# Patient Record
Sex: FEMALE | Race: WHITE | Employment: OTHER | URBAN - NONMETROPOLITAN AREA
[De-identification: names, ages, dates, MRNs, and addresses within clinical notes are randomized per-mention and may not be internally consistent; named-entity substitution may affect disease eponyms.]

---

## 2021-01-13 RX ORDER — FOLIC/CALCIUM/D3/MG CIT/A-CYST 1MG-200-50
1 TABLET ORAL 2 TIMES DAILY
Qty: 60 TABLET | Refills: 11 | Status: SHIPPED | OUTPATIENT
Start: 2021-01-13

## 2021-01-19 RX ORDER — LORATADINE 10 MG/1
TABLET ORAL
Qty: 30 TABLET | Refills: 11 | Status: ON HOLD | OUTPATIENT
Start: 2021-01-19 | End: 2021-03-20 | Stop reason: HOSPADM

## 2021-03-16 ENCOUNTER — HOSPITAL ENCOUNTER (INPATIENT)
Age: 81
LOS: 3 days | Discharge: HOME | DRG: 689 | End: 2021-03-20
Attending: PEDIATRICS | Admitting: FAMILY MEDICINE
Payer: MEDICARE

## 2021-03-16 ENCOUNTER — APPOINTMENT (OUTPATIENT)
Dept: CT IMAGING | Age: 81
DRG: 689 | End: 2021-03-16
Payer: MEDICARE

## 2021-03-16 DIAGNOSIS — N39.0 ACUTE URINARY TRACT INFECTION: ICD-10-CM

## 2021-03-16 DIAGNOSIS — R41.82 ALTERED MENTAL STATUS, UNSPECIFIED ALTERED MENTAL STATUS TYPE: Primary | ICD-10-CM

## 2021-03-16 PROBLEM — F03.90 DEMENTIA (HCC): Status: ACTIVE | Noted: 2021-03-16

## 2021-03-16 PROBLEM — E11.9 TYPE 2 DIABETES MELLITUS (HCC): Status: ACTIVE | Noted: 2021-03-16

## 2021-03-16 LAB
ALBUMIN SERPL-MCNC: 4 G/DL (ref 3.5–5.2)
ALP BLD-CCNC: 69 U/L (ref 35–104)
ALT SERPL-CCNC: 7 U/L (ref 5–33)
ANION GAP SERPL CALCULATED.3IONS-SCNC: 7 MMOL/L (ref 7–19)
AST SERPL-CCNC: 18 U/L (ref 5–32)
BACTERIA: ABNORMAL /HPF
BASOPHILS ABSOLUTE: 0.1 K/UL (ref 0–0.2)
BASOPHILS RELATIVE PERCENT: 1 % (ref 0–1)
BILIRUB SERPL-MCNC: <0.2 MG/DL (ref 0.2–1.2)
BILIRUBIN URINE: NEGATIVE
BLOOD, URINE: NEGATIVE
BUN BLDV-MCNC: 24 MG/DL (ref 8–23)
CALCIUM SERPL-MCNC: 9.2 MG/DL (ref 8.8–10.2)
CHLORIDE BLD-SCNC: 101 MMOL/L (ref 98–111)
CLARITY: CLEAR
CO2: 32 MMOL/L (ref 22–29)
COLOR: YELLOW
CREAT SERPL-MCNC: 0.7 MG/DL (ref 0.5–0.9)
CRYSTALS, UA: ABNORMAL /HPF
EKG P AXIS: 72 DEGREES
EKG P-R INTERVAL: 138 MS
EKG Q-T INTERVAL: 392 MS
EKG QRS DURATION: 88 MS
EKG QTC CALCULATION (BAZETT): 407 MS
EKG T AXIS: 43 DEGREES
EOSINOPHILS ABSOLUTE: 0.2 K/UL (ref 0–0.6)
EOSINOPHILS RELATIVE PERCENT: 2.2 % (ref 0–5)
EPITHELIAL CELLS, UA: 0 /HPF (ref 0–5)
GFR AFRICAN AMERICAN: >59
GFR NON-AFRICAN AMERICAN: >60
GLUCOSE BLD-MCNC: 102 MG/DL (ref 74–109)
GLUCOSE BLD-MCNC: 93 MG/DL (ref 70–99)
GLUCOSE URINE: NEGATIVE MG/DL
HCT VFR BLD CALC: 38.9 % (ref 37–47)
HEMOGLOBIN: 12.5 G/DL (ref 12–16)
HYALINE CASTS: 4 /HPF (ref 0–8)
IMMATURE GRANULOCYTES #: 0 K/UL
KETONES, URINE: NEGATIVE MG/DL
LACTIC ACID, SEPSIS: 0.7 MG/DL (ref 0.5–1.9)
LACTIC ACID, SEPSIS: 0.8 MG/DL (ref 0.5–1.9)
LACTIC ACID: 0.8 MMOL/L (ref 0.5–1.9)
LEUKOCYTE ESTERASE, URINE: ABNORMAL
LYMPHOCYTES ABSOLUTE: 2.6 K/UL (ref 1.1–4.5)
LYMPHOCYTES RELATIVE PERCENT: 32.7 % (ref 20–40)
MCH RBC QN AUTO: 30.3 PG (ref 27–31)
MCHC RBC AUTO-ENTMCNC: 32.1 G/DL (ref 33–37)
MCV RBC AUTO: 94.4 FL (ref 81–99)
MONOCYTES ABSOLUTE: 0.8 K/UL (ref 0–0.9)
MONOCYTES RELATIVE PERCENT: 9.8 % (ref 0–10)
NEUTROPHILS ABSOLUTE: 4.3 K/UL (ref 1.5–7.5)
NEUTROPHILS RELATIVE PERCENT: 53.9 % (ref 50–65)
NITRITE, URINE: POSITIVE
PDW BLD-RTO: 12.3 % (ref 11.5–14.5)
PERFORMED ON: NORMAL
PH UA: 5 (ref 5–8)
PLATELET # BLD: 164 K/UL (ref 130–400)
PMV BLD AUTO: 11.4 FL (ref 9.4–12.3)
POTASSIUM REFLEX MAGNESIUM: 4.2 MMOL/L (ref 3.5–5)
PRO-BNP: 351 PG/ML (ref 0–1800)
PROTEIN UA: NEGATIVE MG/DL
RBC # BLD: 4.12 M/UL (ref 4.2–5.4)
RBC UA: 0 /HPF (ref 0–4)
SARS-COV-2, NAAT: NOT DETECTED
SODIUM BLD-SCNC: 140 MMOL/L (ref 136–145)
SPECIFIC GRAVITY UA: 1.01 (ref 1–1.03)
TOTAL PROTEIN: 7.2 G/DL (ref 6.6–8.7)
TROPONIN: <0.01 NG/ML (ref 0–0.03)
UROBILINOGEN, URINE: 0.2 E.U./DL
WBC # BLD: 8.1 K/UL (ref 4.8–10.8)
WBC UA: 67 /HPF (ref 0–5)

## 2021-03-16 PROCEDURE — 82947 ASSAY GLUCOSE BLOOD QUANT: CPT

## 2021-03-16 PROCEDURE — 80053 COMPREHEN METABOLIC PANEL: CPT

## 2021-03-16 PROCEDURE — 6360000002 HC RX W HCPCS: Performed by: FAMILY MEDICINE

## 2021-03-16 PROCEDURE — 85025 COMPLETE CBC W/AUTO DIFF WBC: CPT

## 2021-03-16 PROCEDURE — 81001 URINALYSIS AUTO W/SCOPE: CPT

## 2021-03-16 PROCEDURE — 83605 ASSAY OF LACTIC ACID: CPT

## 2021-03-16 PROCEDURE — 70450 CT HEAD/BRAIN W/O DYE: CPT

## 2021-03-16 PROCEDURE — 87086 URINE CULTURE/COLONY COUNT: CPT

## 2021-03-16 PROCEDURE — 84484 ASSAY OF TROPONIN QUANT: CPT

## 2021-03-16 PROCEDURE — 6360000002 HC RX W HCPCS: Performed by: PEDIATRICS

## 2021-03-16 PROCEDURE — 87040 BLOOD CULTURE FOR BACTERIA: CPT

## 2021-03-16 PROCEDURE — 96374 THER/PROPH/DIAG INJ IV PUSH: CPT

## 2021-03-16 PROCEDURE — 2580000003 HC RX 258: Performed by: PEDIATRICS

## 2021-03-16 PROCEDURE — 93005 ELECTROCARDIOGRAM TRACING: CPT | Performed by: PEDIATRICS

## 2021-03-16 PROCEDURE — 36415 COLL VENOUS BLD VENIPUNCTURE: CPT

## 2021-03-16 PROCEDURE — G0378 HOSPITAL OBSERVATION PER HR: HCPCS

## 2021-03-16 PROCEDURE — 96372 THER/PROPH/DIAG INJ SC/IM: CPT

## 2021-03-16 PROCEDURE — 6370000000 HC RX 637 (ALT 250 FOR IP): Performed by: FAMILY MEDICINE

## 2021-03-16 PROCEDURE — 99285 EMERGENCY DEPT VISIT HI MDM: CPT

## 2021-03-16 PROCEDURE — 93010 ELECTROCARDIOGRAM REPORT: CPT | Performed by: INTERNAL MEDICINE

## 2021-03-16 PROCEDURE — 87186 SC STD MICRODIL/AGAR DIL: CPT

## 2021-03-16 PROCEDURE — 87635 SARS-COV-2 COVID-19 AMP PRB: CPT

## 2021-03-16 PROCEDURE — 83880 ASSAY OF NATRIURETIC PEPTIDE: CPT

## 2021-03-16 PROCEDURE — P9612 CATHETERIZE FOR URINE SPEC: HCPCS

## 2021-03-16 RX ORDER — MEMANTINE HYDROCHLORIDE 5 MG/1
10 TABLET ORAL 2 TIMES DAILY
Status: DISCONTINUED | OUTPATIENT
Start: 2021-03-16 | End: 2021-03-20 | Stop reason: HOSPADM

## 2021-03-16 RX ORDER — FUROSEMIDE 20 MG/1
20 TABLET ORAL DAILY
Status: DISCONTINUED | OUTPATIENT
Start: 2021-03-16 | End: 2021-03-20 | Stop reason: HOSPADM

## 2021-03-16 RX ORDER — SODIUM CHLORIDE 0.9 % (FLUSH) 0.9 %
10 SYRINGE (ML) INJECTION PRN
Status: DISCONTINUED | OUTPATIENT
Start: 2021-03-16 | End: 2021-03-16 | Stop reason: SDUPTHER

## 2021-03-16 RX ORDER — METOPROLOL SUCCINATE 50 MG/1
50 TABLET, EXTENDED RELEASE ORAL DAILY
COMMUNITY

## 2021-03-16 RX ORDER — ASPIRIN 81 MG/1
81 TABLET ORAL DAILY
Status: DISCONTINUED | OUTPATIENT
Start: 2021-03-16 | End: 2021-03-20 | Stop reason: HOSPADM

## 2021-03-16 RX ORDER — ACETAMINOPHEN 325 MG/1
650 TABLET ORAL EVERY 6 HOURS PRN
Status: DISCONTINUED | OUTPATIENT
Start: 2021-03-16 | End: 2021-03-20 | Stop reason: HOSPADM

## 2021-03-16 RX ORDER — MEMANTINE HYDROCHLORIDE 10 MG/1
10 TABLET ORAL 2 TIMES DAILY
COMMUNITY

## 2021-03-16 RX ORDER — FOLIC/CALCIUM/D3/MG CIT/A-CYST 1MG-200-50
1 TABLET ORAL 2 TIMES DAILY
Status: DISCONTINUED | OUTPATIENT
Start: 2021-03-16 | End: 2021-03-20 | Stop reason: HOSPADM

## 2021-03-16 RX ORDER — SODIUM CHLORIDE 0.9 % (FLUSH) 0.9 %
10 SYRINGE (ML) INJECTION EVERY 12 HOURS SCHEDULED
Status: DISCONTINUED | OUTPATIENT
Start: 2021-03-16 | End: 2021-03-16 | Stop reason: SDUPTHER

## 2021-03-16 RX ORDER — POTASSIUM CHLORIDE 750 MG/1
10 TABLET, FILM COATED, EXTENDED RELEASE ORAL DAILY
COMMUNITY

## 2021-03-16 RX ORDER — POTASSIUM CHLORIDE 750 MG/1
10 TABLET, EXTENDED RELEASE ORAL DAILY
Status: DISCONTINUED | OUTPATIENT
Start: 2021-03-16 | End: 2021-03-20 | Stop reason: HOSPADM

## 2021-03-16 RX ORDER — POLYETHYLENE GLYCOL 3350 17 G/17G
17 POWDER, FOR SOLUTION ORAL DAILY PRN
Status: DISCONTINUED | OUTPATIENT
Start: 2021-03-16 | End: 2021-03-20 | Stop reason: HOSPADM

## 2021-03-16 RX ORDER — FUROSEMIDE 20 MG/1
20 TABLET ORAL DAILY
COMMUNITY

## 2021-03-16 RX ORDER — ASPIRIN 300 MG/1
300 SUPPOSITORY RECTAL DAILY
Status: DISCONTINUED | OUTPATIENT
Start: 2021-03-16 | End: 2021-03-20 | Stop reason: HOSPADM

## 2021-03-16 RX ORDER — 0.9 % SODIUM CHLORIDE 0.9 %
1000 INTRAVENOUS SOLUTION INTRAVENOUS ONCE
Status: COMPLETED | OUTPATIENT
Start: 2021-03-16 | End: 2021-03-16

## 2021-03-16 RX ORDER — METOPROLOL SUCCINATE 50 MG/1
50 TABLET, EXTENDED RELEASE ORAL DAILY
Status: DISCONTINUED | OUTPATIENT
Start: 2021-03-16 | End: 2021-03-20 | Stop reason: HOSPADM

## 2021-03-16 RX ORDER — GUAIFENESIN 600 MG/1
1200 TABLET, EXTENDED RELEASE ORAL 2 TIMES DAILY
COMMUNITY

## 2021-03-16 RX ORDER — CETIRIZINE HYDROCHLORIDE 5 MG/1
5 TABLET ORAL DAILY
Status: DISCONTINUED | OUTPATIENT
Start: 2021-03-16 | End: 2021-03-20 | Stop reason: HOSPADM

## 2021-03-16 RX ORDER — ACETAMINOPHEN 325 MG/1
650 TABLET ORAL EVERY 6 HOURS PRN
COMMUNITY

## 2021-03-16 RX ORDER — ATORVASTATIN CALCIUM 80 MG/1
80 TABLET, FILM COATED ORAL NIGHTLY
Status: DISCONTINUED | OUTPATIENT
Start: 2021-03-16 | End: 2021-03-20 | Stop reason: HOSPADM

## 2021-03-16 RX ORDER — SIMVASTATIN 40 MG
40 TABLET ORAL NIGHTLY
COMMUNITY
End: 2021-12-20

## 2021-03-16 RX ORDER — PROMETHAZINE HYDROCHLORIDE 12.5 MG/1
12.5 TABLET ORAL EVERY 6 HOURS PRN
Status: DISCONTINUED | OUTPATIENT
Start: 2021-03-16 | End: 2021-03-20 | Stop reason: HOSPADM

## 2021-03-16 RX ORDER — OMEPRAZOLE 20 MG/1
20 CAPSULE, DELAYED RELEASE ORAL DAILY
COMMUNITY

## 2021-03-16 RX ORDER — SODIUM CHLORIDE 0.9 % (FLUSH) 0.9 %
10 SYRINGE (ML) INJECTION EVERY 12 HOURS SCHEDULED
Status: DISCONTINUED | OUTPATIENT
Start: 2021-03-16 | End: 2021-03-20 | Stop reason: HOSPADM

## 2021-03-16 RX ORDER — RISPERIDONE 0.5 MG/1
0.5 TABLET, FILM COATED ORAL 2 TIMES DAILY
COMMUNITY

## 2021-03-16 RX ORDER — PANTOPRAZOLE SODIUM 40 MG/1
40 TABLET, DELAYED RELEASE ORAL
Status: DISCONTINUED | OUTPATIENT
Start: 2021-03-17 | End: 2021-03-20 | Stop reason: HOSPADM

## 2021-03-16 RX ORDER — LORATADINE 10 MG/1
10 TABLET ORAL DAILY
COMMUNITY
End: 2021-11-22

## 2021-03-16 RX ORDER — SODIUM CHLORIDE 0.9 % (FLUSH) 0.9 %
10 SYRINGE (ML) INJECTION PRN
Status: DISCONTINUED | OUTPATIENT
Start: 2021-03-16 | End: 2021-03-20 | Stop reason: HOSPADM

## 2021-03-16 RX ORDER — ONDANSETRON 2 MG/ML
4 INJECTION INTRAMUSCULAR; INTRAVENOUS EVERY 6 HOURS PRN
Status: DISCONTINUED | OUTPATIENT
Start: 2021-03-16 | End: 2021-03-20 | Stop reason: HOSPADM

## 2021-03-16 RX ORDER — PAROXETINE HYDROCHLORIDE 20 MG/1
20 TABLET, FILM COATED ORAL EVERY MORNING
COMMUNITY

## 2021-03-16 RX ORDER — RALOXIFENE HYDROCHLORIDE 60 MG/1
60 TABLET, FILM COATED ORAL DAILY
COMMUNITY

## 2021-03-16 RX ORDER — LABETALOL HYDROCHLORIDE 5 MG/ML
10 INJECTION, SOLUTION INTRAVENOUS EVERY 10 MIN PRN
Status: DISCONTINUED | OUTPATIENT
Start: 2021-03-16 | End: 2021-03-20 | Stop reason: HOSPADM

## 2021-03-16 RX ORDER — ATORVASTATIN CALCIUM 20 MG/1
20 TABLET, FILM COATED ORAL NIGHTLY
Status: DISCONTINUED | OUTPATIENT
Start: 2021-03-16 | End: 2021-03-16 | Stop reason: DRUGHIGH

## 2021-03-16 RX ORDER — ACETAMINOPHEN 650 MG/1
650 SUPPOSITORY RECTAL EVERY 6 HOURS PRN
Status: DISCONTINUED | OUTPATIENT
Start: 2021-03-16 | End: 2021-03-20 | Stop reason: HOSPADM

## 2021-03-16 RX ORDER — GUAIFENESIN 600 MG/1
1200 TABLET, EXTENDED RELEASE ORAL 2 TIMES DAILY
Status: DISCONTINUED | OUTPATIENT
Start: 2021-03-16 | End: 2021-03-20 | Stop reason: HOSPADM

## 2021-03-16 RX ADMIN — CEFTRIAXONE 1000 MG: 1 INJECTION, POWDER, FOR SOLUTION INTRAMUSCULAR; INTRAVENOUS at 18:47

## 2021-03-16 RX ADMIN — ENOXAPARIN SODIUM 40 MG: 40 INJECTION SUBCUTANEOUS at 21:05

## 2021-03-16 RX ADMIN — SODIUM CHLORIDE 1000 ML: 9 INJECTION, SOLUTION INTRAVENOUS at 18:47

## 2021-03-16 RX ADMIN — ASPIRIN 300 MG: 300 SUPPOSITORY RECTAL at 20:59

## 2021-03-16 NOTE — ED NOTES
Bed: 15  Expected date:   Expected time:   Means of arrival:   Comments:  EMS     Cayden Butler RN  03/16/21 1581

## 2021-03-16 NOTE — ED NOTES
Neurological assessment performed but patient is not oriented at this time     Isaías Christine, BEATRIZ  03/16/21 3223

## 2021-03-16 NOTE — H&P
Hospital Medicine  History and Physical    Patient:  Anne Gardiner  MRN: [id number]    CHIEF COMPLAINT:  Altered mental status    History Obtained From: daughter, OSH records, chart    PCP: Nisa Corea MD    HISTORY OF PRESENT ILLNESS:  78year old white female brought from the SNF to emergency department due to lethargy, confusion, and facial droop noted early this afternoon. She has a recurring problem with urinary tract infections per her daughter, who is a nurse at this facility. No recent antibiotic use. Had COVID-19 a few months ago but had recovered. No treatment prior to arrival. No exacerbating or relieving factors. Patient is too lethargic to participate in history or physical exam.    REVIEW OF SYSTEMS:  14 systems reviewed and negative except as noted above. Past Medical History:      Diagnosis Date    Dementia without behavioral disturbance (HCC)     Dysphagia     GERD (gastroesophageal reflux disease)     History of COVID-19     History of fall     History of urinary tract infection     Hyperlipidemia     Osteoporosis     Other schizoaffective disorders (HCC)     Personal care impairment     [facility]     Type 2 diabetes mellitus ([facility] [address] [facility]     Vascular dementia ([facility] [address]        Past Surgical History:  History reviewed. No pertinent surgical history. Medications Prior to Admission:    Prior to Admission medications    Medication Sig Start Date End Date Taking?  Authorizing Provider   acetaminophen (TYLENOL) 325 MG tablet Take 650 mg by mouth every 6 hours as needed for Pain   Yes Historical Provider, MD   Folic Acid-Cholecalciferol 5-2805 MG-UNIT TABS Take by mouth   Yes Historical Provider, MD   furosemide (LASIX) 20 MG tablet Take 20 mg by mouth daily   Yes Historical Provider, MD   loratadine (CLARITIN) 10 MG tablet Take 10 mg by mouth daily   Yes Historical Provider, MD   metoprolol succinate (TOPROL XL) 50 MG extended release tablet Take 50 mg by mouth daily   Yes Historical Provider, MD   magnesium hydroxide (MILK OF MAGNESIA) 400 MG/5ML suspension Take 5 mLs by mouth daily as needed for Constipation   Yes Historical Provider, MD   guaiFENesin (MUCINEX) 600 MG extended release tablet Take 1,200 mg by mouth 2 times daily   Yes Historical Provider, MD   memantine (NAMENDA) 10 MG tablet Take 10 mg by mouth 2 times daily   Yes Historical Provider, MD   PARoxetine (PAXIL) 20 MG tablet Take 20 mg by mouth every morning   Yes Historical Provider, MD   potassium chloride (KLOR-CON) 10 MEQ extended release tablet Take 10 mEq by mouth daily   Yes Historical Provider, MD   omeprazole (PRILOSEC) 20 MG delayed release capsule Take 20 mg by mouth daily   Yes Historical Provider, MD   raloxifene (EVISTA) 60 MG tablet Take 60 mg by mouth daily   Yes Historical Provider, MD   risperiDONE (RISPERDAL) 0.5 MG tablet Take 0.5 mg by mouth 2 times daily   Yes Historical Provider, MD   simvastatin (ZOCOR) 40 MG tablet Take 40 mg by mouth nightly   Yes Historical Provider, MD   loratadine (CLARITIN) 10 MG tablet TAKE ONE TABLET BY MOUTH AT 0900 1/19/21  Yes Levar Vazquez MD   FA-D3-Mg Cit-Acetylcyst-Ca Cit (FOLITE) TABS Take 1 tablet by mouth 2 times daily 1/13/21  Yes Levar Vazquez MD       Allergies:  Fosamax [alendronate]    Social History:   TOBACCO:   has no history on file for tobacco.  ETOH:   has no history on file for alcohol. Family History:   History reviewed. No pertinent family history.         Physical Exam:    Vitals: /85   Pulse 78   Temp 98.7 °F (37.1 °C) (Rectal)   Resp 26   SpO2 96%   24HR INTAKE/OUTPUT:  No intake or output data in the 24 hours ending 03/16/21 1828  General appearance: acutely and chronically ill, lethargic  HEENT: atraumatic, eyes with clear conjunctiva and normal lids, pupils and irises normal, external ears and nose are normal, lips normal. Neck without masses, lympadenopathy, bruit, thyroid normal  Lungs: no increased work of breathing, diminished breath sounds bilaterally  Heart: regular rate and rhythm, S1, S2 normal, no murmur, click, rub or gallop  Abdomen: soft, nondistended, not visibly tender  Extremities: edema trace bilaterally, modified Vikash's negative  Neurologic: lethargic, flattened affect, unable to participate in history, disoriented  Skin: no rashes, nodules. CBC:   Recent Labs     03/16/21  1507   WBC 8.1   HGB 12.5        BMP:    Recent Labs     03/16/21  1507      K 4.2      CO2 32*   BUN 24*   CREATININE 0.7   GLUCOSE 102     Hepatic:   Recent Labs     03/16/21  1507   AST 18   ALT 7   BILITOT <0.2   ALKPHOS 69     Troponin T:   Recent Labs     03/16/21  1507   TROPONINI <0.01     ABGs: No results for input(s): PH, PCO2, PO2, HCO3, BE, O2SAT in the last 72 hours. INR: No results for input(s): INR in the last 72 hours. Lactic Acid:   Recent Labs     03/16/21  1507   LACTA 0.8       URINALYSIS: bacteria, WBCs  -----------------------------------------------------------------  CT Head: no acute process    EKG: NSR, PVC, no acute ischemia or infarction    Assessment and Plan   Principal Problem:    Complicated urinary tract infection  Active Problems:    Dementia (HCC)    Altered mental status    Type 2 diabetes mellitus ([facility]  Resolved Problems:    * No resolved hospital problems.  *    Henrietta Curling, DO  Admitting Hospitalist

## 2021-03-17 PROBLEM — E78.5 HLD (HYPERLIPIDEMIA): Status: ACTIVE | Noted: 2021-03-17

## 2021-03-17 PROBLEM — G93.41 ACUTE METABOLIC ENCEPHALOPATHY: Status: ACTIVE | Noted: 2021-03-17

## 2021-03-17 PROBLEM — I10 HTN (HYPERTENSION): Status: ACTIVE | Noted: 2021-03-17

## 2021-03-17 LAB
ANION GAP SERPL CALCULATED.3IONS-SCNC: 8 MMOL/L (ref 7–19)
BUN BLDV-MCNC: 24 MG/DL (ref 8–23)
CALCIUM SERPL-MCNC: 8.2 MG/DL (ref 8.8–10.2)
CHLORIDE BLD-SCNC: 108 MMOL/L (ref 98–111)
CHOLESTEROL, TOTAL: 132 MG/DL (ref 160–199)
CO2: 27 MMOL/L (ref 22–29)
CREAT SERPL-MCNC: 0.7 MG/DL (ref 0.5–0.9)
GFR AFRICAN AMERICAN: >59
GFR NON-AFRICAN AMERICAN: >60
GLUCOSE BLD-MCNC: 93 MG/DL (ref 74–109)
HCT VFR BLD CALC: 33.5 % (ref 37–47)
HDLC SERPL-MCNC: 36 MG/DL (ref 65–121)
HEMOGLOBIN: 10.5 G/DL (ref 12–16)
LDL CHOLESTEROL CALCULATED: 72 MG/DL
MCH RBC QN AUTO: 30 PG (ref 27–31)
MCHC RBC AUTO-ENTMCNC: 31.3 G/DL (ref 33–37)
MCV RBC AUTO: 95.7 FL (ref 81–99)
PDW BLD-RTO: 12.3 % (ref 11.5–14.5)
PLATELET # BLD: 151 K/UL (ref 130–400)
PMV BLD AUTO: 11.9 FL (ref 9.4–12.3)
POTASSIUM REFLEX MAGNESIUM: 3.9 MMOL/L (ref 3.5–5)
RBC # BLD: 3.5 M/UL (ref 4.2–5.4)
SODIUM BLD-SCNC: 143 MMOL/L (ref 136–145)
TRIGL SERPL-MCNC: 120 MG/DL (ref 0–149)
WBC # BLD: 7.5 K/UL (ref 4.8–10.8)

## 2021-03-17 PROCEDURE — 99233 SBSQ HOSP IP/OBS HIGH 50: CPT | Performed by: PSYCHIATRY & NEUROLOGY

## 2021-03-17 PROCEDURE — 2580000003 HC RX 258: Performed by: FAMILY MEDICINE

## 2021-03-17 PROCEDURE — 6360000002 HC RX W HCPCS: Performed by: FAMILY MEDICINE

## 2021-03-17 PROCEDURE — 85027 COMPLETE CBC AUTOMATED: CPT

## 2021-03-17 PROCEDURE — 6370000000 HC RX 637 (ALT 250 FOR IP): Performed by: FAMILY MEDICINE

## 2021-03-17 PROCEDURE — 92610 EVALUATE SWALLOWING FUNCTION: CPT

## 2021-03-17 PROCEDURE — 80048 BASIC METABOLIC PNL TOTAL CA: CPT

## 2021-03-17 PROCEDURE — 36415 COLL VENOUS BLD VENIPUNCTURE: CPT

## 2021-03-17 PROCEDURE — 1210000000 HC MED SURG R&B

## 2021-03-17 PROCEDURE — 80061 LIPID PANEL: CPT

## 2021-03-17 RX ADMIN — ATORVASTATIN CALCIUM 80 MG: 80 TABLET, FILM COATED ORAL at 21:28

## 2021-03-17 RX ADMIN — ASPIRIN 300 MG: 300 SUPPOSITORY RECTAL at 10:34

## 2021-03-17 RX ADMIN — MEMANTINE HYDROCHLORIDE 10 MG: 5 TABLET ORAL at 21:28

## 2021-03-17 RX ADMIN — SODIUM CHLORIDE, PRESERVATIVE FREE 10 ML: 5 INJECTION INTRAVENOUS at 09:52

## 2021-03-17 RX ADMIN — SODIUM CHLORIDE, PRESERVATIVE FREE 1000 MG: 5 INJECTION INTRAVENOUS at 17:37

## 2021-03-17 RX ADMIN — ENOXAPARIN SODIUM 40 MG: 40 INJECTION SUBCUTANEOUS at 21:28

## 2021-03-17 RX ADMIN — GUAIFENESIN 1200 MG: 600 TABLET, EXTENDED RELEASE ORAL at 21:28

## 2021-03-17 ASSESSMENT — ENCOUNTER SYMPTOMS
VOMITING: 0
BACK PAIN: 1
NAUSEA: 0
PHOTOPHOBIA: 0
COUGH: 0

## 2021-03-17 NOTE — PROGRESS NOTES
Speech Language Pathology  Facility/Department: [facility] SURG SERVICES   CLINICAL BEDSIDE SWALLOW EVALUATION    NAME: Terence Trammell  : 1940  MRN: [id number]  ADMISSION DATE: 3/16/2021  ADMITTING DIAGNOSIS: has Complicated urinary tract infection; Dementia ([address] Altered mental status; and Type 2 diabetes mellitus ([facility] [address] on their problem list.     ONSET DATE: 21    Date of Eval: 3/17/2021     Evaluating Therapist: Davie Sharma    Current Diet level:  Current Diet : NPO  Current Liquid Diet : NPO    Primary Complaint  Patient Complaint: Dry mouth     PER PHYSICIAN'S notes:  HISTORY OF PRESENT ILLNESS:  78year old white female brought from the SNF to emergency department due to lethargy, confusion, and facial droop noted early this afternoon. She has a recurring problem with urinary tract infections per her daughter, who is a nurse at this facility. No recent antibiotic use. Had COVID-19 a few months ago but had recovered. No treatment prior to arrival. No exacerbating or relieving factors. Patient is too lethargic to participate in history or physical exam.       Reason for Referral  Terence Trammell was referred for a bedside swallow evaluation to assess the efficiency of her swallow function, identify signs and symptoms of aspiration and make recommendations regarding safe dietary consistencies, effective compensatory strategies, and safe eating environment. Subjective  Pt lethargic and asleep in bed. Pt awoke for CBSE, however had difficulty maintaining alertness. Pt did verbalize a few times and opened eyes on command. SLP continued with evaluation to place pt's diet/liquid orders. Objective:  SLP did not present diet textures due to pt's lethargy and maintaining alertness. However, SLP notes; with pt's decreased alertness level, she continued to follow simple directions with PO presentations and swallowed with all presentations.  No overt s/s of aspiration were observed at the bedside. Impression  Pt presented with mild oral phase dysphagia at the bedside. Pt demonstrated poor labial and lingual coordination, strength which could fatigue mechanisms over a full meal. Pt's pharyngeal phase appeared grossly intact; however further assessment will be completed when pt becomes more alert. SLP recommends minced and moist diet texture with thin liquids. All medications crushed in puree. Dysphagia Diagnosis: Mild oral stage dysphagia    Dysphagia Outcome Severity Scale: Level 5: Mild dysphagia- Distant supervision. May need one diet consistency restricted     Treatment Plan  Requires SLP Intervention: Yes  Duration/Frequency of Treatment: 2-3x for 2-3 days. D/C Recommendations: To be determined     Recommended Diet and Intervention  Diet Solids Recommendation: Dysphagia Minced and Moist (Dysphagia II)  Liquid Consistency Recommendation: Thin  Recommended Form of Meds: Crushed in puree as able  Therapeutic Interventions: Diet tolerance monitoring;Oral care; Patient/Family education    Compensatory Swallowing Strategies  Compensatory Swallowing Strategies: No straws;Upright as possible for all oral intake;Assist feed    Treatment/Goals  Short-term Goals  Timeframe for Short-term Goals: 2-3 days  Goal 1: Tolerate minced and moist diet without s/s of aspiration. Goal 2: Tolerate thin liquids without overt s/s of aspiraiton. Goal 3: Re-assess for potential diet upgrade  Goal 4: Possible cognitive/speech/language evaluation  Goal 5: Staff/family/pt to complete oral care routine to decrease oral bacteria  Goal 6: Pt/family/staff to follow safe swallow and aspiration precautions 100% of opportunities. Long-term Goals  Timeframe for Long-term Goals: 2-3 days  Goal 1: Tolerate safer and least restrictive diet and liquid with minimal to no overt s/s of aspiration. General  Chart Reviewed: Yes  Comments: Pt's daughter at bedside.  Reported she does not have hx of swallowing difficulty. Behavior/Cognition: Cooperative;Lethargic;Requires cueing  Temperature Spikes Noted: No  Respiratory Status: Room air  Breath Sounds: Clear  O2 Device: None (Room air)  Follows Directions: Simple  Dentition: Edentulous  Patient Positioning: Upright in bed  Baseline Vocal Quality: Weak  Prior Dysphagia History: No hx of MBSS in chart. Pt's medical hx revealed dysphagia and GERD dx. Consistencies Administered: Ice Chips; Thin - cup; Thin - straw      Oral Motor Deficits  Oral Motor: Exceptions to [facility]  Labial Strength: Reduced  Labial Sensation: Reduced  Labial Coordination: Reduced  Lingual Strength: Reduced  Lingual Sensation: Reduced  Lingual Coordination: Reduced    Oral Phase Dysfunction  Oral Phase - Comment: Pt's oral phase is affected by her edentulous status. However, pt was too lethargic for SLP to present solid trials. With liquids, pt could not coordinate labial seal around straw for functional sucking. Pt also demonstrated fast oral transit of liquids, however no overt s/s of aspiraiton. Indicators of Pharyngeal Phase Dysfunction  Pharyngeal Phase: WFL  Pharyngeal: Pt's pharyngeal phase appeared to be functional at the bedside. Pt's hyolaryngeal elevation was slightly decreased, which is attributed to her lethargy at time of evaluation. Pt swallowed trials of thin liquids via open cup and teaspoon without overt s/s of aspiraiton. Pt produced 1x additional swallow with all trials. Pt swallowed without PO trials independently. Education  Patient Education: Daughter was provided verbal education on evaluation and diet consistency. She discussed no puree diet texture, but agreed to modified diet at this time with re-eval of diet texture at later date.           RECS  Minced and moist diet  Thin liquids open cup or spoonfuls  Oral care  Meds crushed in puree      ANABEL Bolden  3/17/2021 11:56 AM      Electronically signed by ANABEL Patton on 3/17/2021 at 12:01 PM

## 2021-03-17 NOTE — PROGRESS NOTES
tract infection 3/16/2021 Yes    Dementia ([facility] [address] 3/16/2021 Yes    Altered mental status 3/16/2021 Yes    Type 2 diabetes mellitus ([facility] [address] 3/16/2021 Yes    HTN (hypertension) 3/17/2021 Yes    HLD (hyperlipidemia) 4/29/4633 Yes        Complicated urinary tract infection due to Klebsiella pneumonia  -Awaiting sensitivities of urine culture, blood cultures in process  -Day #2 ceftriaxone  -Tylenol for temperature or pain  -Evidence of sepsis, lactic acid unremarkable localized infection      Altered mental status/history of dementia  -Electrolytes within normal limits, continue to monitor, likely related to diagnosis listed above  -SLP, PT/OT  -Head CT with no acute intracranial abnormality    Type 2 diabetes -condition, continue with insulin modalities, Accu-Cheks q. ACH S, hypoglycemic protocol    Essential hypertension-chronic condition, continue with antihypertensive regimen, routine vitals    Hyperlipidemia-lipid panel reviewed, will continue with patient's statin therapy    EMR Dragon/Transcription disclaimer:   Much of this encounter note is an electronic transcription/translation of spoken language to printed text.  The electronic translation of spoken language may permit erroneous, or at times, nonsensical words or phrases to be inadvertently transcribed; although attempts have made to review the note for such errors, some may still exist.    Electronically signed by   Inna Connell   Internal Medicine Hospitalist  On 3/17/2021  At 12:45 PM

## 2021-03-17 NOTE — CONSULTS
Acid-Cholecalciferol 8-7809 MG-UNIT TABS Take by mouth   Yes Historical Provider, MD   furosemide (LASIX) 20 MG tablet Take 20 mg by mouth daily   Yes Historical Provider, MD   loratadine (CLARITIN) 10 MG tablet Take 10 mg by mouth daily   Yes Historical Provider, MD   metoprolol succinate (TOPROL XL) 50 MG extended release tablet Take 50 mg by mouth daily   Yes Historical Provider, MD   magnesium hydroxide (MILK OF MAGNESIA) 400 MG/5ML suspension Take 5 mLs by mouth daily as needed for Constipation   Yes Historical Provider, MD   guaiFENesin (MUCINEX) 600 MG extended release tablet Take 1,200 mg by mouth 2 times daily   Yes Historical Provider, MD   memantine (NAMENDA) 10 MG tablet Take 10 mg by mouth 2 times daily   Yes Historical Provider, MD   PARoxetine (PAXIL) 20 MG tablet Take 20 mg by mouth every morning   Yes Historical Provider, MD   potassium chloride (KLOR-CON) 10 MEQ extended release tablet Take 10 mEq by mouth daily   Yes Historical Provider, MD   omeprazole (PRILOSEC) 20 MG delayed release capsule Take 20 mg by mouth daily   Yes Historical Provider, MD   raloxifene (EVISTA) 60 MG tablet Take 60 mg by mouth daily   Yes Historical Provider, MD   risperiDONE (RISPERDAL) 0.5 MG tablet Take 0.5 mg by mouth 2 times daily   Yes Historical Provider, MD   simvastatin (ZOCOR) 40 MG tablet Take 40 mg by mouth nightly   Yes Historical Provider, MD   loratadine (CLARITIN) 10 MG tablet TAKE ONE TABLET BY MOUTH AT 0900 1/19/21  Yes César Giron MD   FA-D3-Mg Cit-Acetylcyst-Ca Cit (FOLITE) TABS Take 1 tablet by mouth 2 times daily 1/13/21  Yes César Giron MD       Current Medications:  Current Facility-Administered Medications: cefTRIAXone (ROCEPHIN) 1,000 mg in sodium chloride (PF) 10 mL IV syringe, 1,000 mg, Intravenous, Q24H  sodium chloride flush 0.9 % injection 10 mL, 10 mL, Intravenous, 2 times per day  sodium chloride flush 0.9 % injection 10 mL, 10 mL, Intravenous, PRN  acetaminophen (TYLENOL) tablet 650 mg, 650 mg, Oral, Q6H PRN **OR** acetaminophen (TYLENOL) suppository 650 mg, 650 mg, Rectal, Q6H PRN  Folite TABS 1 tablet, 1 tablet, Oral, BID  promethazine (PHENERGAN) tablet 12.5 mg, 12.5 mg, Oral, Q6H PRN **OR** ondansetron (ZOFRAN) injection 4 mg, 4 mg, Intravenous, Q6H PRN  polyethylene glycol (GLYCOLAX) packet 17 g, 17 g, Oral, Daily PRN  enoxaparin (LOVENOX) injection 40 mg, 40 mg, Subcutaneous, Q24H  aspirin EC tablet 81 mg, 81 mg, Oral, Daily **OR** aspirin suppository 300 mg, 300 mg, Rectal, Daily  labetalol (NORMODYNE;TRANDATE) injection 10 mg, 10 mg, Intravenous, Q10 Min PRN  atorvastatin (LIPITOR) tablet 80 mg, 80 mg, Oral, Nightly  furosemide (LASIX) tablet 20 mg, 20 mg, Oral, Daily  guaiFENesin (MUCINEX) extended release tablet 1,200 mg, 1,200 mg, Oral, BID  cetirizine (ZYRTEC) tablet 5 mg, 5 mg, Oral, Daily  magnesium hydroxide (MILK OF MAGNESIA) 400 MG/5ML suspension 5 mL, 5 mL, Oral, Daily PRN  memantine (NAMENDA) tablet 10 mg, 10 mg, Oral, BID  metoprolol succinate (TOPROL XL) extended release tablet 50 mg, 50 mg, Oral, Daily  pantoprazole (PROTONIX) tablet 40 mg, 40 mg, Oral, QAM AC  potassium chloride (KLOR-CON M) extended release tablet 10 mEq, 10 mEq, Oral, Daily    Allergies:  Fosamax [alendronate]    Habits:  TOBACCO:   has no history on file for tobacco.  ETOH:   has no history on file for alcohol. DRUGS:    Social History     Substance and Sexual Activity   Drug Use Not on file       SOCIAL HISTORY:   Sundar Quiroz is , lives in a NH in [place], [place], and is retired. FAMILY HISTORY:   History reviewed. No pertinent family history. REVIEW OF SYSTEMS:  Review of Systems   Constitutional: Negative for chills and fever. HENT: Positive for hearing loss. Negative for tinnitus. Eyes: Negative for photophobia and visual disturbance. Respiratory: Negative for cough. Cardiovascular: Negative for chest pain and palpitations. Gastrointestinal: Negative for nausea and vomiting. Endocrine: Negative for polydipsia and polyuria. Genitourinary: Positive for frequency and vaginal discharge. Musculoskeletal: Positive for arthralgias and back pain. Skin: Negative for rash. Allergic/Immunologic: Negative for environmental allergies and food allergies. Neurological: Positive for facial asymmetry and weakness. Negative for tremors, seizures, syncope, speech difficulty, light-headedness, numbness and headaches. Psychiatric/Behavioral: Positive for behavioral problems and confusion. Negative for dysphoric mood. The patient is not nervous/anxious. PHYSICAL EXAMINATION:  Vitals: /64   Pulse 74   Temp 97.8 °F (36.6 °C)   Resp 14   SpO2 90%   General appearance: She is an elderly woman who is sleepy but in no distress. Skin: Skin color, texture, turgor normal. No rashes or lesions  HEENT: Head: Normal, normocephalic, atraumatic. No nuchal rigidity. Neck:no adenopathy, no carotid bruit, no JVD, supple, symmetrical, trachea midline and thyroid not enlarged, symmetric, no tenderness/mass/nodules  Lungs: clear to auscultation bilaterally  Heart: regular rate and rhythm, S1, S2 normal, no murmur, click, rub or gallop  Abdomen: soft, non-tender; bowel sounds normal; no masses,  no organomegaly  Extremities: extremities normal, atraumatic, no cyanosis or edema      NEUROLOGIC EXAMINATION:  Neurologic Exam     Mental Status   Disoriented to person. Oriented to place. Disoriented to city. Disoriented to year, month and day. Speech: speech is normal   Able to name object. Able to repeat. Cranial Nerves     CN II   Visual fields full to confrontation. CN III, IV, VI   Pupils are equal, round, and reactive to light.   Extraocular motions are normal.     CN VII   Right facial weakness: none  Left facial weakness: none    CN VIII   Hearing: intact    CN IX, X   Palate: symmetric    CN XI   Right sternocleidomastoid strength: normal  Left sternocleidomastoid strength: normal  Right trapezius strength: normal  Left trapezius strength: normal    CN XII   Tongue: not atrophic  Fasciculations: absent  Tongue deviation: none    Motor Exam   Muscle bulk: decreased  Overall muscle tone: normal  Right arm pronator drift: absent  Left arm pronator drift: absent    Strength   Right neck flexion: 4/5  Left neck flexion: 4/5  Right neck extension: 4/5  Left neck extension: 4/5  Right deltoid: 4/5  Left deltoid: 4/5  Right biceps: 4/5  Left biceps: 4/5  Right triceps: 4/5  Left triceps: 4/5  Right wrist flexion: 4/5  Left wrist flexion: 4/5  Right wrist extension: 4/5  Left wrist extension: 4/5  Right interossei: 4/5  Left interossei: 4/5  Right abdominals: 4/5  Left abdominals: 4/5  Right iliopsoas: 4/5  Left iliopsoas: 4/5  Right quadriceps: 4/5  Left quadriceps: 4/5  Right hamstrin/5  Left hamstrin/5  Right glutei: 4/5  Left glutei: 4/5  Right anterior tibial: 4/5  Left anterior tibial: 4/5  Right posterior tibial: 4/5  Left posterior tibial: 4/5  Right peroneal: 4/5  Left peroneal: 4/5  Right gastroc: 4/5  Left gastroc: 4/5    Sensory Exam   Light touch normal.     Gait, Coordination, and Reflexes     Tremor   Resting tremor: absent  Intention tremor: absent  Action tremor: absent    Reflexes   Right brachioradialis: 0  Left brachioradialis: 0  Right biceps: 0  Left biceps: 0  Right triceps: 0  Left triceps: 0  Right patellar: 0  Left patellar: 0  Right achilles: 0  Left achilles: 0  Right plantar: normal  Left plantar: normal  Right Schneider: absent  Left Schneider: absent  Right ankle clonus: absent  Left ankle clonus: absent  Rapid alternating movements were normal.       ADDITIONAL REVIEW:  CBC:    Recent Labs     21  1507 21  0408   WBC 8.1 7.5   HGB 12.5 10.5*    151     BMP:     Recent Labs     21  1507 21  0408    143   K 4.2 3.9    108   CO2 32* 27   BUN 24* 24*   CREATININE 0.7 0.7   GLUCOSE 102 93     Hepatic:  Recent Labs 03/16/21  1507   AST 18   ALT 7   BILITOT <0.2   ALKPHOS 69     Troponin T:    Recent Labs     03/16/21  1507   TROPONINI <0.01     Lipids:    Recent Labs     03/17/21  0408   CHOL 132*   HDL 36*     Narrative   EXAM: CT OF THE HEAD WITHOUT IV CONTRAST 3/16/2021   COMPARISON: None    INDICATION: Female, 78years-old. Altered mental status. PROCEDURE: Non contrast enhanced head CT was performed. Radiation dose equals  mGy-cm.  Automated exposure control dose   reduction technique was implemented. FINDINGS:    Mild-to-moderate generalized cerebral volume loss. Mild to moderate   chronic microvascular changes. . There is no evidence of mass-effect or   midline shift. The gray-white differentiation is preserved. Caleb Luke is   no evidence of intracranial contusion, hemorrhage, or skull fracture. The visualized portions of the paranasal sinuses are free of   obstructive mucosal disease. The visualized portions of the mastoid   air cells are clear.       Impression   1.   No acute intracranial abnormality. Signed by Dr Hector Severino on 3/16/2021 3:29 PM       IMPRESSION:  1. Encephalopathy. I do not see anything focal to indicate a stroke  2. UTI  3. Dementia  4. Normocytic anemia    PLAN:  1. Continue to assess clinicaly  2. Abx  3. PT/OT when appropriate  4.  Christine Lanier M.D.

## 2021-03-17 NOTE — PROGRESS NOTES
Physical Therapy    Pt sleeping, has been very lethargic and unable to stay awake. Pt unable to meaningfully participate at this time. Will cont to follow.     Electronically signed by Jessee Barreto PT on 3/17/2021 at 1:14 PM

## 2021-03-18 LAB
ANION GAP SERPL CALCULATED.3IONS-SCNC: 8 MMOL/L (ref 7–19)
BUN BLDV-MCNC: 19 MG/DL (ref 8–23)
CALCIUM SERPL-MCNC: 8.5 MG/DL (ref 8.8–10.2)
CHLORIDE BLD-SCNC: 107 MMOL/L (ref 98–111)
CO2: 26 MMOL/L (ref 22–29)
CREAT SERPL-MCNC: 0.6 MG/DL (ref 0.5–0.9)
FOLATE: >20 NG/ML (ref 4.8–37.3)
GFR AFRICAN AMERICAN: >59
GFR NON-AFRICAN AMERICAN: >60
GLUCOSE BLD-MCNC: 80 MG/DL (ref 74–109)
HCT VFR BLD CALC: 35.1 % (ref 37–47)
HEMOGLOBIN: 11.1 G/DL (ref 12–16)
MCH RBC QN AUTO: 30.3 PG (ref 27–31)
MCHC RBC AUTO-ENTMCNC: 31.6 G/DL (ref 33–37)
MCV RBC AUTO: 95.9 FL (ref 81–99)
ORGANISM: ABNORMAL
PDW BLD-RTO: 12.2 % (ref 11.5–14.5)
PLATELET # BLD: 153 K/UL (ref 130–400)
PMV BLD AUTO: 11.6 FL (ref 9.4–12.3)
POTASSIUM REFLEX MAGNESIUM: 3.7 MMOL/L (ref 3.5–5)
RBC # BLD: 3.66 M/UL (ref 4.2–5.4)
SODIUM BLD-SCNC: 141 MMOL/L (ref 136–145)
TSH SERPL DL<=0.05 MIU/L-ACNC: 1.19 UIU/ML (ref 0.27–4.2)
URINE CULTURE, ROUTINE: ABNORMAL
URINE CULTURE, ROUTINE: ABNORMAL
VITAMIN B-12: 510 PG/ML (ref 211–946)
WBC # BLD: 9.1 K/UL (ref 4.8–10.8)

## 2021-03-18 PROCEDURE — 80048 BASIC METABOLIC PNL TOTAL CA: CPT

## 2021-03-18 PROCEDURE — 6370000000 HC RX 637 (ALT 250 FOR IP): Performed by: FAMILY MEDICINE

## 2021-03-18 PROCEDURE — 2580000003 HC RX 258: Performed by: FAMILY MEDICINE

## 2021-03-18 PROCEDURE — 6360000002 HC RX W HCPCS: Performed by: FAMILY MEDICINE

## 2021-03-18 PROCEDURE — 36415 COLL VENOUS BLD VENIPUNCTURE: CPT

## 2021-03-18 PROCEDURE — 97116 GAIT TRAINING THERAPY: CPT

## 2021-03-18 PROCEDURE — 92526 ORAL FUNCTION THERAPY: CPT

## 2021-03-18 PROCEDURE — 82746 ASSAY OF FOLIC ACID SERUM: CPT

## 2021-03-18 PROCEDURE — 85027 COMPLETE CBC AUTOMATED: CPT

## 2021-03-18 PROCEDURE — 1210000000 HC MED SURG R&B

## 2021-03-18 PROCEDURE — 84443 ASSAY THYROID STIM HORMONE: CPT

## 2021-03-18 PROCEDURE — 99232 SBSQ HOSP IP/OBS MODERATE 35: CPT | Performed by: PSYCHIATRY & NEUROLOGY

## 2021-03-18 PROCEDURE — 97161 PT EVAL LOW COMPLEX 20 MIN: CPT

## 2021-03-18 PROCEDURE — 82607 VITAMIN B-12: CPT

## 2021-03-18 RX ORDER — CALCIUM CARBONATE 200(500)MG
500 TABLET,CHEWABLE ORAL 2 TIMES DAILY
Status: DISCONTINUED | OUTPATIENT
Start: 2021-03-18 | End: 2021-03-20 | Stop reason: HOSPADM

## 2021-03-18 RX ADMIN — ASPIRIN 81 MG: 81 TABLET, FILM COATED ORAL at 08:45

## 2021-03-18 RX ADMIN — GUAIFENESIN 1200 MG: 600 TABLET, EXTENDED RELEASE ORAL at 08:46

## 2021-03-18 RX ADMIN — GUAIFENESIN 1200 MG: 600 TABLET, EXTENDED RELEASE ORAL at 19:50

## 2021-03-18 RX ADMIN — ENOXAPARIN SODIUM 40 MG: 40 INJECTION SUBCUTANEOUS at 19:50

## 2021-03-18 RX ADMIN — CETIRIZINE HYDROCHLORIDE 5 MG: 5 TABLET, FILM COATED ORAL at 08:46

## 2021-03-18 RX ADMIN — ATORVASTATIN CALCIUM 80 MG: 80 TABLET, FILM COATED ORAL at 19:50

## 2021-03-18 RX ADMIN — MEMANTINE HYDROCHLORIDE 10 MG: 5 TABLET ORAL at 08:46

## 2021-03-18 RX ADMIN — ACETAMINOPHEN 650 MG: 325 TABLET ORAL at 08:46

## 2021-03-18 RX ADMIN — FUROSEMIDE 20 MG: 20 TABLET ORAL at 08:46

## 2021-03-18 RX ADMIN — PANTOPRAZOLE SODIUM 40 MG: 40 TABLET, DELAYED RELEASE ORAL at 08:46

## 2021-03-18 RX ADMIN — ANTACID TABLETS 500 MG: 500 TABLET, CHEWABLE ORAL at 08:46

## 2021-03-18 RX ADMIN — MEMANTINE HYDROCHLORIDE 10 MG: 5 TABLET ORAL at 19:50

## 2021-03-18 RX ADMIN — SODIUM CHLORIDE, PRESERVATIVE FREE 1000 MG: 5 INJECTION INTRAVENOUS at 17:44

## 2021-03-18 RX ADMIN — SODIUM CHLORIDE, PRESERVATIVE FREE 10 ML: 5 INJECTION INTRAVENOUS at 19:51

## 2021-03-18 RX ADMIN — POTASSIUM CHLORIDE 10 MEQ: 750 TABLET, EXTENDED RELEASE ORAL at 08:46

## 2021-03-18 RX ADMIN — METOPROLOL SUCCINATE 50 MG: 50 TABLET, EXTENDED RELEASE ORAL at 08:45

## 2021-03-18 RX ADMIN — SODIUM CHLORIDE, PRESERVATIVE FREE 10 ML: 5 INJECTION INTRAVENOUS at 10:13

## 2021-03-18 ASSESSMENT — PAIN SCALES - GENERAL
PAINLEVEL_OUTOF10: 3
PAINLEVEL_OUTOF10: 5

## 2021-03-18 NOTE — PROGRESS NOTES
Physical Therapy    Facility/Department: [facility] SURG SERVICES  Initial Assessment    NAME: Samantha Epperson  : 1940  MRN: [id number]    Date of Service: 3/18/2021    Discharge Recommendations:  Continue to assess pending progress, Patient would benefit from continued therapy after discharge        Assessment   Body structures, Functions, Activity limitations: Decreased functional mobility ; Decreased strength;Decreased safe awareness;Decreased endurance;Decreased cognition;Decreased posture  Assessment: Pt will benefit from skilled therapy to address mobility defcitis. Req VC for RW management as pt is used to 3RA. Dec alertness but able to participate in eval and follow commands. Will progress as tolerated. Specific instructions for Next Treatment: gait, transfers  Prognosis: Fair  Decision Making: Low Complexity  PT Education: PT Role;Plan of Care;General Safety;Transfer Training;Functional Mobility Training;Gait Training  REQUIRES PT FOLLOW UP: Yes  Activity Tolerance  Activity Tolerance: Patient Tolerated treatment well       Patient Diagnosis(es): The primary encounter diagnosis was Altered mental status, unspecified altered mental status type. A diagnosis of Acute urinary tract infection was also pertinent to this visit. has a past medical history of Dementia without behavioral disturbance ([address] Dysphagia, GERD (gastroesophageal reflux disease), History of COVID-19, History of fall, History of urinary tract infection, Hyperlipidemia, Osteoporosis, Other schizoaffective disorders ([address] Personal care impairment, Recurrent major depression ([address] Type 2 diabetes mellitus ([facility] [address] and Vascular dementia ([facility] [address] has no past surgical history on file.     Restrictions     Vision/Hearing        Subjective  General  Patient assessed for rehabilitation services?: Yes  Diagnosis: Complicated urinary tract infection/acute metabolic encephalopathy  Follows Commands: Within Functional Limits  Subjective  Subjective: Pt agreeable to therapy humberto this am.  Pain Screening  Patient Currently in Pain: No  Vital Signs  Patient Currently in Pain: No       Orientation     Social/Functional History  Social/Functional History  Type of Home: Facility  Home Equipment: 4 wheeled walker  Additional Comments: Pt previously at [facility]  Cognition   Cognition  Overall Cognitive Status: Exceptions  Arousal/Alertness: Delayed responses to stimuli  Attention Span: Difficulty dividing attention  Problem Solving: Assistance required to generate solutions  Insights: Decreased awareness of deficits  Initiation: Requires cues for all  Sequencing: Requires cues for all    Objective          AROM RLE (degrees)  RLE AROM: WFL  AROM LLE (degrees)  LLE AROM : WFL  Strength RLE  Strength RLE: WFL  Comment: Grossly antigravity  Strength LLE  Strength LLE: WFL  Comment: Grossly antigravity        Bed mobility  Supine to Sit: Stand by assistance  Scooting: Moderate assistance  Transfers  Sit to Stand: Contact guard assistance;Minimal Assistance  Stand to sit: Contact guard assistance  Ambulation  Ambulation?: Yes  WB Status: WBAT  Ambulation 1  Surface: level tile  Device: Rolling Walker  Assistance: Contact guard assistance  Gait Deviations: Slow Carolyn; Shuffles;Decreased step length;Decreased step height  Distance: 15ft     Balance  Posture: Poor  Sitting - Static: Fair  Sitting - Dynamic: Fair  Standing - Static: Fair  Standing - Dynamic: [address]  Times per week: 5-7x  Times per day: Daily  Plan weeks: 2 weeks  Specific instructions for Next Treatment: gait, transfers  Current Treatment Recommendations: Strengthening, Functional Mobility Training, Transfer Training, Endurance Training, Gait Training  Safety Devices  Type of devices: Left in chair, Call light within reach, Sitter present, Gait belt    G-Code       OutComes Score                                                  AM-PAC Score             Goals  Short term goals  Time Frame for Short term goals: 2 weeks  Short term goal 1: Supine<>sit, ind  Short term goal 2: STS, mod ind w RW  Short term goal 3: Amb 150ft w RW, sup       Therapy Time   Individual Concurrent Group Co-treatment   Time In           Time Out           Minutes                   Brennan Felty       Electronically signed by Brennan Felty on 3/18/2021 at 11:23 AM

## 2021-03-18 NOTE — PROGRESS NOTES
Progress Note  Date:3/18/2021       Room:0528/528-01  Patient Cruz Brothers     YOB: 1940     Age:80 y.o. Subjective    Subjective   Patient seen and examined this a.m. daughter present at the bedside. Case reviewed with nursing staff continued improvement towards baseline. Patient states she feels improved, noticeably fatigued. Denies headache, change in vision, chest pain, abdominal pain, fevers or chills. Cumulative hospital course: Patient was admitted 3/16, presenting from local nursing home facility due to lethargy, confusion as well as left-sided facial droop, patient has a recurrent history of urinary tract infections per daughter who is a nurse at facility. There has been no recent antibiotic usage, patient did have COVID-19 infection a few months ago and has recovered well. Urinalysis with nitrite +4+ bacteria, urine culture with growth of Klebsiella pneumonia activities resulted, continues on ceftriaxone day #3. Continues to work with therapy services anticipate discharge back to skilled nursing facility in the next 24/48 hours. Neuro consulted and following. Review of Systems   ROS: 14 point review of systems is negative except as specifically addressed above. Objective         Vitals Last 24 Hours:  TEMPERATURE:  Temp  Av.6 °F (36.4 °C)  Min: 97 °F (36.1 °C)  Max: 99.1 °F (37.3 °C)  RESPIRATIONS RANGE: Resp  Av  Min: 16  Max: 16  PULSE OXIMETRY RANGE: SpO2  Av.7 %  Min: 92 %  Max: 95 %  PULSE RANGE: Pulse  Av.5  Min: 70  Max: 84  BLOOD PRESSURE RANGE: Systolic (49YLN), WSK:242 , Min:107 , TUQ:172   ; Diastolic (60ADU), ALEXANDRU:48, Min:58, Max:67    I/O (24Hr): No intake or output data in the 24 hours ending 21 1210      Physical Exam  Vitals signs and nursing note reviewed. Constitutional:       Comments: Fatigued appearing   HENT:      Head: Normocephalic and atraumatic.       Nose: Nose normal.   Eyes:      Conjunctiva/sclera: Conjunctivae normal.   Cardiovascular:      Rate and Rhythm: Normal rate and regular rhythm. Pulses: Normal pulses. Pulmonary:      Effort: Pulmonary effort is normal. No respiratory distress. Abdominal:      General: Bowel sounds are normal.      Tenderness: There is no abdominal tenderness. There is no guarding or rebound. Neurological:      Mental Status: She is alert. Motor: Weakness (improving ) present. Comments: Appreciate underlying dementia         Labs/Imaging/Diagnostics    Labs:  CBC:  Recent Labs     03/16/21  1507 03/17/21  0408 03/18/21  0325   WBC 8.1 7.5 9.1   RBC 4.12* 3.50* 3.66*   HGB 12.5 10.5* 11.1*   HCT 38.9 33.5* 35.1*   MCV 94.4 95.7 95.9   RDW 12.3 12.3 12.2    151 153     CHEMISTRIES:  Recent Labs     03/16/21  1507 03/17/21  0408 03/18/21  0325    143 141   K 4.2 3.9 3.7    108 107   CO2 32* 27 26   BUN 24* 24* 19   CREATININE 0.7 0.7 0.6   GLUCOSE 102 93 80     PT/INR:No results for input(s): PROTIME, INR in the last 72 hours. APTT:No results for input(s): APTT in the last 72 hours. LIVER PROFILE:  Recent Labs     03/16/21  1507   AST 18   ALT 7   BILITOT <0.2   ALKPHOS 69       Imaging Last 24 Hours:  Ct Head Wo Contrast    Result Date: 3/16/2021  EXAM: CT OF THE HEAD WITHOUT IV CONTRAST 3/16/2021 COMPARISON: None INDICATION: Female, 78years-old. Altered mental status. PROCEDURE: Non contrast enhanced head CT was performed. Radiation dose equals  mGy-cm. Automated exposure control dose reduction technique was implemented. FINDINGS: Mild-to-moderate generalized cerebral volume loss. Mild to moderate chronic microvascular changes. . There is no evidence of mass-effect or midline shift. The gray-white differentiation is preserved. There is no evidence of intracranial contusion, hemorrhage, or skull fracture. The visualized portions of the paranasal sinuses are free of obstructive mucosal disease.  The visualized portions of the mastoid air cells are clear. 1.   No acute intracranial abnormality. Signed by Dr Delma Kinsey on 3/16/2021 3:29 PM    Assessment//Plan           Hospital Problems           Last Modified POA    * (Principal) Complicated urinary tract infection 3/16/2021 Yes    Dementia ([facility] 3/16/2021 Yes    Altered mental status 3/16/2021 Yes    Type 2 diabetes mellitus ([facility] [address] 3/16/2021 Yes    HTN (hypertension) 3/17/2021 Yes    HLD (hyperlipidemia) 3/17/2021 Yes    Acute metabolic encephalopathy 7/47/0070 Yes        Complicated urinary tract infection due to Klebsiella pneumonia  -Awaiting sensitivities of urine culture, blood cultures in process  -Day #3 ceftriaxone  -Tylenol for temperature or pain  -No Evidence of sepsis, lactic acid unremarkable localized infection      Altered mental status/history of dementia  -Electrolytes within normal limits, continue to monitor, likely related to diagnosis listed above  -SLP, PT/OT  -Head CT with no acute intracranial abnormality  -Improvement seen    Type 2 diabetes -condition, continue with insulin modalities, Accu-Cheks q. ACH S, hypoglycemic protocol    Essential hypertension-chronic condition, continue with antihypertensive regimen, routine vitals    Hyperlipidemia-lipid panel reviewed, will continue with patient's statin therapy      Discharge disposition-return to skilled nursing facility in the next 24 to 48 hours    EMR Dragon/Transcription disclaimer:   Much of this encounter note is an electronic transcription/translation of spoken language to printed text.  The electronic translation of spoken language may permit erroneous, or at times, nonsensical words or phrases to be inadvertently transcribed; although attempts have made to review the note for such errors, some may still exist.    Electronically signed by   [facility]   Internal Medicine Hospitalist  On 3/18/2021  At 12:10 PM

## 2021-03-18 NOTE — PROGRESS NOTES
Speech Language Pathology  Facility/Department: [facility] SURG SERVICES  SWALLOW THERAPY     NAME: Conner Brito  : 1940  MRN: [id number]    ADMISSION DATE: 3/16/2021  ADMITTING DIAGNOSIS: has Complicated urinary tract infection; Dementia ([facility] [address] Altered mental status; Type 2 diabetes mellitus ([facility] [address] HTN (hypertension); HLD (hyperlipidemia); and Acute metabolic encephalopathy on their problem list.    Date of Treat: 3/18/2021  Evaluating Therapist: Robbie Candlearia    Current Diet level:  Minced and moist consistency with thin liquids    Reason for Referral  Conner Brito was referred for a bedside swallow evaluation to assess the efficiency of her swallow function, identify signs and symptoms of aspiration and make recommendations regarding safe dietary consistencies, effective compensatory strategies, and safe eating environment. Impression  Re-assessed patient's swallowing function. Patient exhibits slow, decreased oral prep and decreased oral transit of more solid consistencies (regular solid consistency residue was slow but cleared from the mouth with additional dry swallows). Patient also exhibits sluggish, inconsistently mildly decreased laryngeal elevation for swallow airway protection. Even so, no outward S/S penetration/aspiration was noted with any puree consistency presentation, regular solid consistency trial, or thin liquid presentation administered during treatment session this date. At this time, would trial soft and bite sized consistency. Continue thin liquids. Assist in meal set-up. Will continue to follow. Treatment Plan  Requires SLP Intervention: Yes     Recommended Diet and Intervention  Diet Solids Recommendation: Soft and bite sized  Liquid Consistency Recommendation: Thin   Recommended Form of Meds: PO  Therapeutic Interventions: Patient/Family education;Diet tolerance monitoring; Therapeutic PO trials with SLP     Compensatory Swallowing Strategies  Compensatory Swallowing Strategies: Upright as possible for all oral intake;Small bites/sips;Eat/Feed slowly; Alternate solids and liquids; Remain upright for 30-45 minutes after meals     Treatment/Goals  Timeframe for Short-term Goals: 1x/day for 3 days   Goal 1: Patient will tolerate soft and bite sized consistency and thin liquids with min S/S penetration/aspiration during PO intake. Goal 2: Patient staff will follow swallow safety recommendations to decrease risk of penetration/aspiration during PO intake. Goal 3: Re-assessment of swallow function for potential diet upgrade. General  Chart Reviewed: Yes  Behavior/Cognition: Alert; Cooperative  O2 Device: None (Room air)  Communication Observation: (Patient exhibited decreased volume of speech and decreased labial movements during verbalizations.)  Follows Directions: Simple   Dentition: Edentulous   Patient Positioning: Upright in bed  Consistencies Administered: Dysphagia Pureed (Dysphagia I); Regular solid; Thin - straw     Re-assessed patient's swallowing function with the following observations noted:     Oral Phase  Mastication: Regular solid (Patient exhibited slow vertical jaw movement during oral prep of regular solid consistency trials presented independently.)  Oral Transit: Regular solid (Moderate oral cavity residue was noted post swallows; residue was slow but cleared from the mouth with additional dry swallows.)  Oral Phase - Comment: Oral transit of puree consistency presentations, administered independently, primarily measured 1-2 seconds in length and no oral cavity residue was noted post swallows. Oral transit of regular solid consistency primarily measured 1-2 seconds in length. Oral transit of thin liquid presentations, administered independently via straw, primarily measured 1 second in length.       Pharyngeal Phase  Laryngeal Elevation: (Patient exhibited sluggish, inconsistently mildly decreased laryngeal elevation for swallow airway protection.)  Pharyngeal Phase - Comment: No outward S/S penetration/aspiration was noted with any puree consistency presentation, regular solid consistency trial, or thin liquid presentation administered during treatment session this date. At this time, would trial soft and bite sized consistency. Continue thin liquids. Assist in meal set-up. Will continue to follow.     Electronically signed by ANABEL Link on 3/18/2021 at 12:59 PM

## 2021-03-18 NOTE — CARE COORDINATION
Pt is from [facility] and may DC back on Saturday March 20th if medically cleared. Pt will need negative covid test done on the day of discharge.    [facility]  Electronically signed by Damon Alcantara on 3/18/2021 at 8:46 AM

## 2021-03-18 NOTE — PROGRESS NOTES
[address], [address]  [place], [place]  Phone [phone]     Neurology Progress Note  3/18/2021 9:09 AM  Information:   Patient Name: Lorraine Sanchez  :   1940  Age:   78 y.o. MRN:   [id number]  Account #:  [de-identified]  Admit Date:   3/16/2021  Today:  3/18/21     ADMIT DX:   Complicated urinary tract infection    Subjective:     Lorraine Sanchez is a 78y.o. year old woman with a history of dementia who was admitted 3/16 with confusion, lethargy, and UTI. Interval History:   She was restless last night and had to have a sitter as she kept trying to get out of bed. Currently she is sleepy but wakens easily and converses and follows instructions. She does complain of a headache. Objective:     Past Medical History:  Past Medical History:   Diagnosis Date    Dementia without behavioral disturbance (HCC)     Dysphagia     GERD (gastroesophageal reflux disease)     History of COVID-19     History of fall     History of urinary tract infection     Hyperlipidemia     Osteoporosis     Other schizoaffective disorders ([facility] [address]     Personal care impairment     Recurrent major depression ([facility] [address]     Type 2 diabetes mellitus ([facility] [address]     Vascular dementia ([facility] [address]        History reviewed. No pertinent surgical history. History reviewed. No pertinent family history.     Social History     Socioeconomic History    Marital status: Unknown     Spouse name: Not on file    Number of children: Not on file    Years of education: Not on file    Highest education level: Not on file   Occupational History    Not on file   Social Needs    Financial resource strain: Not on file    Food insecurity     Worry: Not on file     Inability: Not on file    Transportation needs     Medical: Not on file     Non-medical: Not on file   Tobacco Use    Smoking status: Not on file   Substance and Sexual Activity    Alcohol use: Not on file    Drug use: Not on file    Sexual activity: Not on file Lifestyle    Physical activity     Days per week: Not on file     Minutes per session: Not on file    Stress: Not on file   Relationships    Social connections     Talks on phone: Not on file     Gets together: Not on file     Attends Hindu service: Not on file     Active member of club or organization: Not on file     Attends meetings of clubs or organizations: Not on file     Relationship status: Not on file    Intimate partner violence     Fear of current or ex partner: Not on file     Emotionally abused: Not on file     Physically abused: Not on file     Forced sexual activity: Not on file   Other Topics Concern    Not on file   Social History Narrative    Not on file       Medications:     calcium carbonate  500 mg Oral BID    cefTRIAXone (ROCEPHIN) IV  1,000 mg Intravenous Q24H    sodium chloride flush  10 mL Intravenous 2 times per day    Folite  1 tablet Oral BID    enoxaparin  40 mg Subcutaneous Q24H    aspirin  81 mg Oral Daily    Or    aspirin  300 mg Rectal Daily    atorvastatin  80 mg Oral Nightly    furosemide  20 mg Oral Daily    guaiFENesin  1,200 mg Oral BID    cetirizine  5 mg Oral Daily    memantine  10 mg Oral BID    metoprolol succinate  50 mg Oral Daily    pantoprazole  40 mg Oral QAM AC    potassium chloride  10 mEq Oral Daily       Diagnostic Studies:  Reviewed all labs/diagnositcs since last 24hrs:  Recent Results (from the past 24 hour(s))   Basic Metabolic Panel w/ Reflex to MG    Collection Time: 03/18/21  3:25 AM   Result Value Ref Range    Sodium 141 136 - 145 mmol/L    Potassium reflex Magnesium 3.7 3.5 - 5.0 mmol/L    Chloride 107 98 - 111 mmol/L    CO2 26 22 - 29 mmol/L    Anion Gap 8 7 - 19 mmol/L    Glucose 80 74 - 109 mg/dL    BUN 19 8 - 23 mg/dL    CREATININE 0.6 0.5 - 0.9 mg/dL    GFR Non-African American >60 >60    GFR African American >59 >59    Calcium 8.5 (L) 8.8 - 10.2 mg/dL   TSH without Reflex    Collection Time: 03/18/21  3:25 AM   Result Value Ref Range    TSH 1.190 0.270 - 4.200 uIU/mL   CBC    Collection Time: 03/18/21  3:25 AM   Result Value Ref Range    WBC 9.1 4.8 - 10.8 K/uL    RBC 3.66 (L) 4.20 - 5.40 M/uL    Hemoglobin 11.1 (L) 12.0 - 16.0 g/dL    Hematocrit 35.1 (L) 37.0 - 47.0 %    MCV 95.9 81.0 - 99.0 fL    MCH 30.3 27.0 - 31.0 pg    MCHC 31.6 (L) 33.0 - 37.0 g/dL    RDW 12.2 11.5 - 14.5 %    Platelets 585 911 - 392 K/uL    MPV 11.6 9.4 - 12.3 fL       Diet:  DIET DYSPHAGIA MINCED AND MOIST; Dysphagia Minced and Moist    Examination:  Vitals: /61   Pulse 84   Temp 97 °F (36.1 °C)   Resp 16   Wt 138 lb (62.6 kg)   SpO2 95%  No intake or output data in the 24 hours ending 03/18/21 0909  General appearance: She is an elderly woman who is sleepy but in no distress. Skin: Skin color, texture, turgor normal. No rashes or lesions  HEENT: Head: Normal, normocephalic, atraumatic. No nuchal rigidity. Neck:no adenopathy, no carotid bruit, no JVD, supple, symmetrical, trachea midline and thyroid not enlarged, symmetric, no tenderness/mass/nodules  Lungs: clear to auscultation bilaterally  Heart: regular rate and rhythm, S1, S2 normal, no murmur, click, rub or gallop  Abdomen: soft, non-tender; bowel sounds normal; no masses,  no organomegaly  Extremities: extremities normal, atraumatic, no cyanosis or edema  Neurologic:  Alert. Not oriented to place or date. No dysarthria. Speech is fluent. EOMI, PERRL, VFF. No facial weakness. Limb strength is grossly normal.      Assessment:   1. Encephalopathy. I do not see anything focal to indicate a stroke  2. UTI  3. Dementia  4. Normocytic anemia    Plan:   1. Increase activity, up to chair  2. PT/OT  3. Abx  Discussed with daughter. Discharge planning:   [place]    Reviewed treatment plans with the patient and/or family. 25 minutes spent in face to face interaction and coordination of care.      Electronically signed by John Larry MD on 3/18/2021 at 9:09 AM

## 2021-03-19 LAB
ANION GAP SERPL CALCULATED.3IONS-SCNC: 9 MMOL/L (ref 7–19)
BUN BLDV-MCNC: 17 MG/DL (ref 8–23)
CALCIUM SERPL-MCNC: 8.5 MG/DL (ref 8.8–10.2)
CHLORIDE BLD-SCNC: 104 MMOL/L (ref 98–111)
CO2: 27 MMOL/L (ref 22–29)
CREAT SERPL-MCNC: 0.6 MG/DL (ref 0.5–0.9)
GFR AFRICAN AMERICAN: >59
GFR NON-AFRICAN AMERICAN: >60
GLUCOSE BLD-MCNC: 92 MG/DL (ref 70–99)
GLUCOSE BLD-MCNC: 93 MG/DL (ref 74–109)
HCT VFR BLD CALC: 33.5 % (ref 37–47)
HEMOGLOBIN: 10.7 G/DL (ref 12–16)
MCH RBC QN AUTO: 30.5 PG (ref 27–31)
MCHC RBC AUTO-ENTMCNC: 31.9 G/DL (ref 33–37)
MCV RBC AUTO: 95.4 FL (ref 81–99)
PDW BLD-RTO: 12.4 % (ref 11.5–14.5)
PERFORMED ON: NORMAL
PLATELET # BLD: 151 K/UL (ref 130–400)
PMV BLD AUTO: 12.3 FL (ref 9.4–12.3)
POTASSIUM REFLEX MAGNESIUM: 3.7 MMOL/L (ref 3.5–5)
RBC # BLD: 3.51 M/UL (ref 4.2–5.4)
SODIUM BLD-SCNC: 140 MMOL/L (ref 136–145)
WBC # BLD: 8.1 K/UL (ref 4.8–10.8)

## 2021-03-19 PROCEDURE — 97116 GAIT TRAINING THERAPY: CPT

## 2021-03-19 PROCEDURE — 6370000000 HC RX 637 (ALT 250 FOR IP): Performed by: FAMILY MEDICINE

## 2021-03-19 PROCEDURE — 6360000002 HC RX W HCPCS: Performed by: FAMILY MEDICINE

## 2021-03-19 PROCEDURE — 2580000003 HC RX 258: Performed by: FAMILY MEDICINE

## 2021-03-19 PROCEDURE — 1210000000 HC MED SURG R&B

## 2021-03-19 PROCEDURE — 80048 BASIC METABOLIC PNL TOTAL CA: CPT

## 2021-03-19 PROCEDURE — 99232 SBSQ HOSP IP/OBS MODERATE 35: CPT | Performed by: PSYCHIATRY & NEUROLOGY

## 2021-03-19 PROCEDURE — 85027 COMPLETE CBC AUTOMATED: CPT

## 2021-03-19 PROCEDURE — 82947 ASSAY GLUCOSE BLOOD QUANT: CPT

## 2021-03-19 PROCEDURE — 36415 COLL VENOUS BLD VENIPUNCTURE: CPT

## 2021-03-19 PROCEDURE — 97530 THERAPEUTIC ACTIVITIES: CPT

## 2021-03-19 RX ADMIN — SODIUM CHLORIDE, PRESERVATIVE FREE 10 ML: 5 INJECTION INTRAVENOUS at 08:32

## 2021-03-19 RX ADMIN — PANTOPRAZOLE SODIUM 40 MG: 40 TABLET, DELAYED RELEASE ORAL at 06:23

## 2021-03-19 RX ADMIN — METOPROLOL SUCCINATE 50 MG: 50 TABLET, EXTENDED RELEASE ORAL at 08:31

## 2021-03-19 RX ADMIN — FUROSEMIDE 20 MG: 20 TABLET ORAL at 08:31

## 2021-03-19 RX ADMIN — ATORVASTATIN CALCIUM 80 MG: 80 TABLET, FILM COATED ORAL at 21:02

## 2021-03-19 RX ADMIN — MEMANTINE HYDROCHLORIDE 10 MG: 5 TABLET ORAL at 21:02

## 2021-03-19 RX ADMIN — SODIUM CHLORIDE, PRESERVATIVE FREE 10 ML: 5 INJECTION INTRAVENOUS at 22:32

## 2021-03-19 RX ADMIN — GUAIFENESIN 1200 MG: 600 TABLET, EXTENDED RELEASE ORAL at 08:31

## 2021-03-19 RX ADMIN — MEMANTINE HYDROCHLORIDE 10 MG: 5 TABLET ORAL at 08:31

## 2021-03-19 RX ADMIN — SODIUM CHLORIDE, PRESERVATIVE FREE 1000 MG: 5 INJECTION INTRAVENOUS at 18:25

## 2021-03-19 RX ADMIN — ENOXAPARIN SODIUM 40 MG: 40 INJECTION SUBCUTANEOUS at 21:02

## 2021-03-19 RX ADMIN — ASPIRIN 81 MG: 81 TABLET, FILM COATED ORAL at 08:31

## 2021-03-19 RX ADMIN — ACETAMINOPHEN 650 MG: 325 TABLET ORAL at 08:31

## 2021-03-19 RX ADMIN — GUAIFENESIN 1200 MG: 600 TABLET, EXTENDED RELEASE ORAL at 21:02

## 2021-03-19 RX ADMIN — CETIRIZINE HYDROCHLORIDE 5 MG: 5 TABLET, FILM COATED ORAL at 08:31

## 2021-03-19 RX ADMIN — POTASSIUM CHLORIDE 10 MEQ: 750 TABLET, EXTENDED RELEASE ORAL at 08:31

## 2021-03-19 ASSESSMENT — PAIN DESCRIPTION - PAIN TYPE
TYPE: ACUTE PAIN
TYPE: ACUTE PAIN

## 2021-03-19 ASSESSMENT — PAIN DESCRIPTION - LOCATION
LOCATION: HEAD
LOCATION: HEAD

## 2021-03-19 ASSESSMENT — PAIN DESCRIPTION - FREQUENCY: FREQUENCY: CONTINUOUS

## 2021-03-19 ASSESSMENT — PAIN DESCRIPTION - ORIENTATION: ORIENTATION: OTHER (COMMENT)

## 2021-03-19 ASSESSMENT — PAIN DESCRIPTION - DESCRIPTORS: DESCRIPTORS: ACHING;HEADACHE

## 2021-03-19 ASSESSMENT — PAIN DESCRIPTION - ONSET: ONSET: GRADUAL

## 2021-03-19 ASSESSMENT — PAIN SCALES - GENERAL
PAINLEVEL_OUTOF10: 5
PAINLEVEL_OUTOF10: 0

## 2021-03-19 ASSESSMENT — PAIN - FUNCTIONAL ASSESSMENT: PAIN_FUNCTIONAL_ASSESSMENT: ACTIVITIES ARE NOT PREVENTED

## 2021-03-19 NOTE — PLAN OF CARE
Problem: Skin Integrity:  Goal: Will show no infection signs and symptoms  Description: Will show no infection signs and symptoms  3/19/2021 1042 by Anjel Quezada RN  Outcome: Ongoing  3/19/2021 0208 by Salome Garcia RN  Outcome: Ongoing  Goal: Absence of new skin breakdown  Description: Absence of new skin breakdown  3/19/2021 1042 by Anjel Quezada RN  Outcome: Ongoing  3/19/2021 0208 by Saolme Garcia RN  Outcome: Ongoing     Problem: Falls - Risk of:  Goal: Will remain free from falls  Description: Will remain free from falls  3/19/2021 1042 by Anjel Quezada RN  Outcome: Ongoing  3/19/2021 0208 by Salome Garcia RN  Outcome: Ongoing  Goal: Absence of physical injury  Description: Absence of physical injury  3/19/2021 1042 by Anjel Quezada RN  Outcome: Ongoing  3/19/2021 0208 by Salome Garcia RN  Outcome: Ongoing     Problem: Confusion - Acute:  Goal: Absence of continued neurological deterioration signs and symptoms  Description: Absence of continued neurological deterioration signs and symptoms  3/19/2021 1042 by Anjel Quezada RN  Outcome: Ongoing  3/19/2021 0208 by Salome Garcia RN  Outcome: Ongoing  Goal: Mental status will be restored to baseline  Description: Mental status will be restored to baseline  3/19/2021 1042 by Anjel Quezada RN  Outcome: Ongoing  3/19/2021 0208 by Salome Garcia RN  Outcome: Ongoing     Problem: Discharge Planning:  Goal: Ability to perform activities of daily living will improve  Description: Ability to perform activities of daily living will improve  3/19/2021 1042 by Anjel Quezada RN  Outcome: Ongoing  3/19/2021 0208 by Salome Garcia RN  Outcome: Ongoing  Goal: Participates in care planning  Description: Participates in care planning  3/19/2021 1042 by Anjel Quezada RN  Outcome: Ongoing  3/19/2021 0208 by Salome Garcia RN  Outcome: Ongoing     Problem: Injury - Risk of, Physical Injury:  Goal: Will remain free from falls  Description: Will remain free from falls  3/19/2021 1042 by Sharda Miller Kenneth Christensen RN  Outcome: Ongoing  3/19/2021 0208 by Ron Jones RN  Outcome: Ongoing  Goal: Absence of physical injury  Description: Absence of physical injury  3/19/2021 1042 by Joanie Jc RN  Outcome: Ongoing  3/19/2021 0208 by Ron Jones RN  Outcome: Ongoing     Problem: Mood - Altered:  Goal: Mood stable  Description: Mood stable  3/19/2021 1042 by Joanie Jc RN  Outcome: Ongoing  3/19/2021 0208 by Ron Jones RN  Outcome: Ongoing  Goal: Absence of abusive behavior  Description: Absence of abusive behavior  3/19/2021 1042 by Joanie Jc RN  Outcome: Ongoing  3/19/2021 0208 by Ron Jones RN  Outcome: Ongoing  Goal: Verbalizations of feeling emotionally comfortable while being cared for will increase  Description: Verbalizations of feeling emotionally comfortable while being cared for will increase  3/19/2021 1042 by Joanie Jc RN  Outcome: Ongoing  3/19/2021 0208 by Ron Jones RN  Outcome: Ongoing     Problem: Psychomotor Activity - Altered:  Goal: Absence of psychomotor disturbance signs and symptoms  Description: Absence of psychomotor disturbance signs and symptoms  3/19/2021 1042 by Joanie Jc RN  Outcome: Ongoing  3/19/2021 0208 by Ron Jones RN  Outcome: Ongoing     Problem: Sensory Perception - Impaired:  Goal: Demonstrations of improved sensory functioning will increase  Description: Demonstrations of improved sensory functioning will increase  3/19/2021 1042 by Joanie Jc RN  Outcome: Ongoing  3/19/2021 0208 by Ron Jones RN  Outcome: Ongoing  Goal: Decrease in sensory misperception frequency  Description: Decrease in sensory misperception frequency  3/19/2021 1042 by Joanie cJ RN  Outcome: Ongoing  3/19/2021 0208 by Ron Jones RN  Outcome: Ongoing  Goal: Able to refrain from responding to false sensory perceptions  Description: Able to refrain from responding to false sensory perceptions  3/19/2021 1042 by Joanie Jc RN  Outcome: Ongoing  3/19/2021 0208 by Ron Jones RN  Outcome: Ongoing  Goal: Demonstrates accurate environmental perceptions  Description: Demonstrates accurate environmental perceptions  3/19/2021 1042 by Sandra Garcia RN  Outcome: Ongoing  3/19/2021 0208 by Analilia Arshad RN  Outcome: Ongoing  Goal: Able to distinguish between reality-based and nonreality-based thinking  Description: Able to distinguish between reality-based and nonreality-based thinking  3/19/2021 1042 by Sandra Garcia RN  Outcome: Ongoing  3/19/2021 0208 by Analilia Arshad RN  Outcome: Ongoing  Goal: Able to interrupt nonreality-based thinking  Description: Able to interrupt nonreality-based thinking  3/19/2021 1042 by Sandra Garcia RN  Outcome: Ongoing  3/19/2021 0208 by Analilia Arshad RN  Outcome: Ongoing     Problem: Sleep Pattern Disturbance:  Goal: Appears well-rested  Description: Appears well-rested  3/19/2021 1042 by Sandra Garcia RN  Outcome: Ongoing  3/19/2021 0208 by Analilia Arshad RN  Outcome: Ongoing     Problem: Pain:  Goal: Pain level will decrease  Description: Pain level will decrease  Outcome: Ongoing  Goal: Control of acute pain  Description: Control of acute pain  Outcome: Ongoing  Goal: Control of chronic pain  Description: Control of chronic pain  Outcome: Ongoing

## 2021-03-19 NOTE — PROGRESS NOTES
Progress Note  Date:3/19/2021       Room:0528/528-01  Patient Adair Smith     YOB: 1940     Age:80 y.o. Subjective    Subjective   Patient seen and examined this a.m. daughter at the bedside, per daughter patient is back to baseline was able to recognize her. No acute distress this a.m., patient states she feels hot blankets removed. Plans for discharge back to skilled nursing facility in the a.m. Denies headache, change in vision, chest pain, abdominal pain. Cumulative hospital course: Patient was admitted 3/16, presenting from local nursing home facility due to lethargy, confusion as well as left-sided facial droop, patient has a recurrent history of urinary tract infections per daughter who is a nurse at facility. There has been no recent antibiotic usage, patient did have COVID-19 infection a few months ago and has recovered well. Urinalysis with nitrite +4+ bacteria, urine culture with growth of Klebsiella pneumonia activities resulted, continues on ceftriaxone day #4. Patient to be discharged back to skilled nursing facility in the a.m. Neuro consulted and following. Review of Systems   ROS: 14 point review of systems is negative except as specifically addressed above. Objective         Vitals Last 24 Hours:  TEMPERATURE:  Temp  Av.4 °F (36.3 °C)  Min: 97 °F (36.1 °C)  Max: 97.8 °F (36.6 °C)  RESPIRATIONS RANGE: Resp  Av.2  Min: 16  Max: 20  PULSE OXIMETRY RANGE: SpO2  Av.8 %  Min: 93 %  Max: 97 %  PULSE RANGE: Pulse  Av.8  Min: 78  Max: 84  BLOOD PRESSURE RANGE: Systolic (02QER), IQJ:643 , Min:107 , XIX:174   ; Diastolic (76INK), IPL:28, Min:53, Max:74    I/O (24Hr): Intake/Output Summary (Last 24 hours) at 3/19/2021 0828  Last data filed at 3/18/2021 2250  Gross per 24 hour   Intake 880 ml   Output --   Net 880 ml         Physical Exam  Vitals signs and nursing note reviewed.    Constitutional:       General: She is not in acute distress. Appearance: Normal appearance. Comments: Fatigued appearing   HENT:      Head: Normocephalic and atraumatic. Nose: Nose normal.   Eyes:      Conjunctiva/sclera: Conjunctivae normal.   Cardiovascular:      Rate and Rhythm: Normal rate and regular rhythm. Pulses: Normal pulses. Pulmonary:      Effort: Pulmonary effort is normal. No respiratory distress. Abdominal:      General: Bowel sounds are normal.      Tenderness: There is no abdominal tenderness. There is no guarding or rebound. Neurological:      Mental Status: She is alert. Mental status is at baseline. Motor: Weakness (improving ) present. Comments: Appreciate underlying dementia   Psychiatric:         Mood and Affect: Mood normal.         Labs/Imaging/Diagnostics    Labs:  CBC:  Recent Labs     03/17/21  0408 03/18/21  0325 03/19/21  0339   WBC 7.5 9.1 8.1   RBC 3.50* 3.66* 3.51*   HGB 10.5* 11.1* 10.7*   HCT 33.5* 35.1* 33.5*   MCV 95.7 95.9 95.4   RDW 12.3 12.2 12.4    153 151     CHEMISTRIES:  Recent Labs     03/17/21  0408 03/18/21 0325 03/19/21  0339    141 140   K 3.9 3.7 3.7    107 104   CO2 27 26 27   BUN 24* 19 17   CREATININE 0.7 0.6 0.6   GLUCOSE 93 80 93     PT/INR:No results for input(s): PROTIME, INR in the last 72 hours. APTT:No results for input(s): APTT in the last 72 hours. LIVER PROFILE:  Recent Labs     03/16/21  1507   AST 18   ALT 7   BILITOT <0.2   ALKPHOS 69       Imaging Last 24 Hours:  Ct Head Wo Contrast    Result Date: 3/16/2021  EXAM: CT OF THE HEAD WITHOUT IV CONTRAST 3/16/2021 COMPARISON: None INDICATION: Female, 78years-old. Altered mental status. PROCEDURE: Non contrast enhanced head CT was performed. Radiation dose equals  mGy-cm. Automated exposure control dose reduction technique was implemented. FINDINGS: Mild-to-moderate generalized cerebral volume loss. Mild to moderate chronic microvascular changes. . There is no evidence of mass-effect or midline shift. The gray-white differentiation is preserved. There is no evidence of intracranial contusion, hemorrhage, or skull fracture. The visualized portions of the paranasal sinuses are free of obstructive mucosal disease. The visualized portions of the mastoid air cells are clear. 1.   No acute intracranial abnormality. Signed by Dr Dimple Culp on 3/16/2021 3:29 PM    Assessment//Plan           Hospital Problems           Last Modified POA    * (Principal) Complicated urinary tract infection 3/16/2021 Yes    Dementia ([facility] [address] 3/16/2021 Yes    Altered mental status 3/16/2021 Yes    Type 2 diabetes mellitus ([facility] [address] 3/16/2021 Yes    HTN (hypertension) 3/17/2021 Yes    HLD (hyperlipidemia) 3/17/2021 Yes    Acute metabolic encephalopathy 6/66/1366 Yes        Complicated urinary tract infection due to Klebsiella pneumonia  -Awaiting sensitivities of urine culture, blood cultures no growth to date  -Day #4 ceftriaxone, plans for 3 days of Omnicef upon discharge  -Tylenol for temperature or pain  -No Evidence of sepsis, lactic acid unremarkable localized infection      Altered mental status/history of dementia  -Pt is  currently back to baseline  -Electrolytes within normal limits, continue to monitor, likely related to diagnosis listed above  -SLP, PT/OT  -Head CT with no acute intracranial abnormality  -Improvement seen    Type 2 diabetes -condition, continue with insulin modalities, Accu-Cheks q. ACH S, hypoglycemic protocol    Essential hypertension-chronic condition, continue with antihypertensive regimen, routine vitals    Hyperlipidemia-lipid panel reviewed, will continue with patient's statin therapy      Discharge disposition-return to skilled nursing facility in the a.m. EMR Dragon/Transcription disclaimer:   Much of this encounter note is an electronic transcription/translation of spoken language to printed text.  The electronic translation of spoken language may permit erroneous, or at times, nonsensical words or phrases to be inadvertently transcribed; although attempts have made to review the note for such errors, some may still exist.    Electronically signed by   Jesusita Birmingham   Internal Medicine Hospitalist  On 3/19/2021  At 8:28 AM

## 2021-03-19 NOTE — PROGRESS NOTES
Speech Language Pathology  Facility/Department: [facility] SURG SERVICES  Dysphagia Daily Treatment Note    NAME: Hernando Cherry  : 1940  MRN: [id number]    Patient Diagnosis(es):   Patient Active Problem List    Diagnosis Date Noted    HTN (hypertension) 2021    HLD (hyperlipidemia) 2021    Acute metabolic encephalopathy     Complicated urinary tract infection 2021    Dementia ([facility] [address] 2021    Altered mental status 2021    Type 2 diabetes mellitus ([facility] 2021     Allergies: Allergies   Allergen Reactions    Fosamax [Alendronate]      Onset Date: 21    Current Diet Level:  Soft and bite-sized, thin liquids    Subjective:  Pt asleep in recliner chair. She woke easily when her name was called. She was alert and cooperative when awoke. She requested to eat her meal tray that was sitting bedside since lunch meal.      Diet Tolerance:  Patient tolerating current diet level without signs/symptoms of penetration/aspiration. P.O. Trials: Thin    Pt consumed thin liquids via straw w/o overt s/s of aspiration. Yancey       Honey       Puree       Solid    Soft solids on meal tray. Pt self-fed. No overt s/s of aspiration. Slightly prolonged mastication, minimal to no oral residue. Used liquid wash independently to clear residuals. Impressions:  Pt assessed with meal tray. At this time, SLP recommends to continue current soft and bite-sized diet texture with thin liquids. Pt requires assistance to set-up meal tray, but self-feeds and chooses items independently. Pt presented without overt s/s of aspiration. LE is slightly decreased for airway protection, however no outward s/s of aspiration were observed with meal.     Patient/Family/Caregiver Education:  Pt educated on continued current diet/liquids. Treatment Plan  Requires SLP Intervention:  Yes     Recommended Diet and Intervention  Diet Solids Recommendation: Soft and bite sized  Liquid Consistency Recommendation: Thin   Recommended Form of Meds: PO  Therapeutic Interventions: Patient/Family education;Diet tolerance monitoring; Therapeutic PO trials with SLP     Compensatory Swallowing Strategies  Compensatory Swallowing Strategies: Upright as possible for all oral intake;Small bites/sips;Eat/Feed slowly; Alternate solids and liquids; Remain upright for 30-45 minutes after meals     Treatment/Goals  Timeframe for Short-term Goals: 1x/day for 3 days   Goal 1: Patient will tolerate soft and bite sized consistency and thin liquids with min S/S penetration/aspiration during PO intake. Goal 2: Patient staff will follow swallow safety recommendations to decrease risk of penetration/aspiration during PO intake. Goal 3: Re-assessment of swallow function for potential diet upgrade.      General  Chart Reviewed: Yes  Behavior/Cognition: Alert; Cooperative  O2 Device: None (Room air)  Communication Observation: Decreased functional   Follows Directions: Simple   Dentition: Edentulous   Patient Positioning: Upright in bed  Consistencies Administered:soft and bite-sized; Regular solid; Thin - straw    Plan:  Continued daily Dysphagia treatment with goals per plan of care.          ANABEL De La Fuente   Electronically signed by Cinthia White on 3/19/21 at 2:04 PM CDT

## 2021-03-19 NOTE — PROGRESS NOTES
Physical Therapy  Name: Nilsa Servin  MRN:  [id number]  Date of service:  3/19/2021         03/19/21 1353   Subjective   Subjective Pt states she wants to walk   Pain Screening   Patient Currently in Pain Denies   Bed Mobility   Rolling Contact guard assistance   Supine to Sit Contact guard assistance;Minimal assistance   Comment pt rolled for clean up and change of brief   Transfers   Sit to Stand Contact guard assistance   Stand to sit Contact guard assistance   Ambulation   Ambulation? Yes   WB Status WBAT   Ambulation 1   Surface level tile   Device Rolling Walker   Assistance Contact guard assistance   Gait Deviations Slow Carolyn;Decreased step length;Decreased step height   Distance 45'   Comments increased amb distance   Short term goals   Time Frame for Short term goals 2 weeks   Short term goal 1 Supine<>sit, ind   Short term goal 2 STS, mod ind w RW   Short term goal 3 Amb 150ft w RW, sup   Conditions Requiring Skilled Therapeutic Intervention   Body structures, Functions, Activity limitations Decreased functional mobility ; Decreased strength;Decreased safe awareness;Decreased endurance;Decreased cognition;Decreased posture   Assessment Pt up to recliner, increased amb distance. Activity Tolerance   Activity Tolerance Patient Tolerated treatment well   Safety Devices   Type of devices Left in chair;Call light within reach; Chair alarm in place     Electronically signed by Ghulam Draper PTA on 3/19/2021 at 1:56 PM

## 2021-03-19 NOTE — PROGRESS NOTES
12.0 - 16.0 g/dL    Hematocrit 33.5 (L) 37.0 - 47.0 %    MCV 95.4 81.0 - 99.0 fL    MCH 30.5 27.0 - 31.0 pg    MCHC 31.9 (L) 33.0 - 37.0 g/dL    RDW 12.4 11.5 - 14.5 %    Platelets 199 219 - 951 K/uL    MPV 12.3 9.4 - 12.3 fL   POCT Glucose    Collection Time: 03/19/21  7:13 AM   Result Value Ref Range    POC Glucose 92 70 - 99 mg/dl    Performed on AccuChek        Diet:  DIET DYSPHAGIA SOFT AND BITE-SIZED; Examination:  Vitals: /72   Pulse 80   Temp 97.6 °F (36.4 °C)   Resp 18   Wt 138 lb (62.6 kg)   SpO2 93%      Intake/Output Summary (Last 24 hours) at 3/19/2021 1321  Last data filed at 3/19/2021 0800  Gross per 24 hour   Intake 730 ml   Output --   Net 730 ml     General appearance: She is an elderly woman who is in no distress. Skin: Skin color, texture, turgor normal. No rashes or lesions  HEENT: Head: Normal, normocephalic, atraumatic.  No nuchal rigidity. Neck:no adenopathy, no carotid bruit, no JVD, supple, symmetrical, trachea midline and thyroid not enlarged, symmetric, no tenderness/mass/nodules  Lungs: clear to auscultation bilaterally  Heart: regular rate and rhythm, S1, S2 normal, no murmur, click, rub or gallop  Abdomen: soft, non-tender; bowel sounds normal; no masses,  no organomegaly  Extremities: extremities normal, atraumatic, no cyanosis or edema  Neurologic:  Alert. Not oriented to place or date. No dysarthria. Speech is fluent. EOMI, PERRL, VFF. No facial weakness. Limb strength is grossly normal.     Assessment:   1.         Encephalopathy. Mace  seems back to her baseline and plans are for her to go back to her SNF tomorrow. 2.         UTI  3.         Dementia  4.         Normocytic anemia    Plan:   1. Abx  2. PT/OT  3. Signing off. Call if needed. Discharge planning:   [facility]    Reviewed treatment plans with the patient and/or family. 25 minutes spent in face to face interaction and coordination of care.      Electronically signed by Kayla Dudley MD on 3/19/2021 at 1:21 PM

## 2021-03-20 VITALS
RESPIRATION RATE: 17 BRPM | SYSTOLIC BLOOD PRESSURE: 113 MMHG | OXYGEN SATURATION: 93 % | WEIGHT: 138 LBS | TEMPERATURE: 98.1 F | DIASTOLIC BLOOD PRESSURE: 72 MMHG | HEART RATE: 69 BPM

## 2021-03-20 LAB
HCT VFR BLD CALC: 37.9 % (ref 37–47)
HEMOGLOBIN: 11.6 G/DL (ref 12–16)
MCH RBC QN AUTO: 30.4 PG (ref 27–31)
MCHC RBC AUTO-ENTMCNC: 30.6 G/DL (ref 33–37)
MCV RBC AUTO: 99.2 FL (ref 81–99)
PDW BLD-RTO: 12.3 % (ref 11.5–14.5)
PLATELET # BLD: 146 K/UL (ref 130–400)
PMV BLD AUTO: 11.4 FL (ref 9.4–12.3)
RBC # BLD: 3.82 M/UL (ref 4.2–5.4)
SARS-COV-2, NAAT: NOT DETECTED
WBC # BLD: 7.5 K/UL (ref 4.8–10.8)

## 2021-03-20 PROCEDURE — 36415 COLL VENOUS BLD VENIPUNCTURE: CPT

## 2021-03-20 PROCEDURE — 2580000003 HC RX 258: Performed by: FAMILY MEDICINE

## 2021-03-20 PROCEDURE — 6370000000 HC RX 637 (ALT 250 FOR IP): Performed by: FAMILY MEDICINE

## 2021-03-20 PROCEDURE — 85027 COMPLETE CBC AUTOMATED: CPT

## 2021-03-20 PROCEDURE — 87635 SARS-COV-2 COVID-19 AMP PRB: CPT

## 2021-03-20 RX ORDER — CEFDINIR 300 MG/1
300 CAPSULE ORAL 2 TIMES DAILY
Qty: 6 CAPSULE | Refills: 0 | Status: SHIPPED | OUTPATIENT
Start: 2021-03-20 | End: 2021-03-23

## 2021-03-20 RX ADMIN — POTASSIUM CHLORIDE 10 MEQ: 750 TABLET, EXTENDED RELEASE ORAL at 08:42

## 2021-03-20 RX ADMIN — CETIRIZINE HYDROCHLORIDE 5 MG: 5 TABLET, FILM COATED ORAL at 08:43

## 2021-03-20 RX ADMIN — MEMANTINE HYDROCHLORIDE 10 MG: 5 TABLET ORAL at 08:42

## 2021-03-20 RX ADMIN — FUROSEMIDE 20 MG: 20 TABLET ORAL at 08:43

## 2021-03-20 RX ADMIN — PANTOPRAZOLE SODIUM 40 MG: 40 TABLET, DELAYED RELEASE ORAL at 08:42

## 2021-03-20 RX ADMIN — ANTACID TABLETS 500 MG: 500 TABLET, CHEWABLE ORAL at 08:43

## 2021-03-20 RX ADMIN — ASPIRIN 81 MG: 81 TABLET, FILM COATED ORAL at 08:43

## 2021-03-20 RX ADMIN — SODIUM CHLORIDE, PRESERVATIVE FREE 10 ML: 5 INJECTION INTRAVENOUS at 08:43

## 2021-03-20 RX ADMIN — METOPROLOL SUCCINATE 50 MG: 50 TABLET, EXTENDED RELEASE ORAL at 08:42

## 2021-03-20 RX ADMIN — GUAIFENESIN 1200 MG: 600 TABLET, EXTENDED RELEASE ORAL at 08:43

## 2021-03-20 NOTE — DISCHARGE INSTR - COC
Continuity of Care Form    Patient Name: Nilsa Servin   :  1940  MRN:  [id number]    Admit date:  3/16/2021  Discharge date:  3/20/2021    Code Status Order: [facility]   Advance Directives:   [facility] Documentation     Date/Time Healthcare Directive Type of Healthcare Directive Copy in [address] Agent's Name Healthcare Agent's Phone Number    21 [phone]  Yes, patient has an advance directive for healthcare treatment  --  --  --  --  --          Admitting Physician:  Henrietta Curling, DO  PCP: Avelina Vazquez MD    Discharging Nurse:  [facility] Unit/Room#: 0106/043-69  Discharging Unit Phone Number: [phone]    Emergency Contact:   Extended Emergency Contact Information  Primary Emergency Contact: Letha Wilson, [address] Phone: [phone]  Mobile Phone: [phone]  Relation: Child   needed? No    Past Surgical History:  History reviewed. No pertinent surgical history. Immunization History: There is no immunization history on file for this patient.     Active Problems:  Patient Active Problem List   Diagnosis Code    Complicated urinary tract infection N39.0    Dementia ([facility] [address] F03.90    Altered mental status R41.82    Type 2 diabetes mellitus ([facility] [address] E11.9    HTN (hypertension) I10    HLD (hyperlipidemia) E78.5    Acute metabolic encephalopathy K95.23       Isolation/Infection:   Isolation          No Isolation        Patient Infection Status     Infection Onset Added Last Indicated Last Indicated By Review Planned Expiration Resolved Resolved By    None active    Resolved    COVID-19 Rule Out 21 COVID-19, Rapid (Ordered)   21 Rule-Out Test Resulted    COVID-19 Rule Out 21 COVID-19 (Ordered)   21 Rule-Out Test Resulted          Nurse Assessment:  Last Vital Signs: /68   Pulse 69   Temp 97 °F (36.1 °C) (Temporal)   Resp 17   Wt 138 lb (62.6 kg)   SpO2 92% Last documented pain score (0-10 scale): Pain Level: 0  Last Weight:   Wt Readings from Last 1 Encounters:   03/18/21 138 lb (62.6 kg)     Mental Status:  oriented and alert    IV Access:  - None    Nursing Mobility/ADLs:  Walking   Assisted  Transfer  Assisted  Bathing  Assisted  Dressing  Assisted  Toileting  Assisted  Feeding  [address]  Independent  Med Delivery   whole    Wound Care Documentation and Therapy:        Elimination:  Continence:   · Bowel: Yes  · Bladder: Yes  Urinary Catheter: None   Colostomy/Ileostomy/Ileal Conduit: No       Date of Last BM: 3/20/2021    Intake/Output Summary (Last 24 hours) at 3/20/2021 1025  Last data filed at 3/20/2021 0935  Gross per 24 hour   Intake 870 ml   Output --   Net 870 ml     I/O last 3 completed shifts: In: 5 [P.O.:840]  Out: -     Safety Concerns: At Risk for Falls    Impairments/Disabilities:      None    Nutrition Therapy:  Current Nutrition Therapy:   - Oral Diet:  Dental Soft    Routes of Feeding: Oral  Liquids: Thin Liquids  Daily Fluid Restriction: no  Last Modified Barium Swallow with Video (Video Swallowing Test): not done    Treatments at the Time of Hospital Discharge:   Respiratory Treatments:   Oxygen Therapy:  is not on home oxygen therapy.   Ventilator:    - No ventilator support    Rehab Therapies: Physical Therapy, Occupational Therapy and Speech/Language Therapy  Weight Bearing Status/Restrictions: No weight bearing restirctions  Other Medical Equipment (for information only, NOT a DME order):  walker  Other Treatments:     Patient's personal belongings (please select all that are sent with patient):  clothes    RN SIGNATURE:  Electronically signed by Tra Fields LPN on 7/84/34 at 17:07 AM CDT    CASE MANAGEMENT/SOCIAL WORK SECTION    Inpatient Status Date: ***    Readmission Risk Assessment Score:  Readmission Risk              Risk of Unplanned Readmission:        18           Discharging to Facility/ Agency   · Name: · Address:  · Phone:  · Fax:    Dialysis Facility (if applicable)   · Name:  · Address:  · Dialysis Schedule:  · Phone:  · Fax:    / signature: {Esignature:[id number]}    PHYSICIAN SECTION    Prognosis: {Prognosis:6582291844}    Condition at Discharge: Madan Gates Patient Condition:785881445}    Rehab Potential (if transferring to Rehab): {Prognosis:1671730456}    Recommended Labs or Other Treatments After Discharge: ***    Physician Certification: I certify the above information and transfer of Paul Garcia  is necessary for the continuing treatment of the diagnosis listed and that she requires {Admit to Appropriate Level of Care:53415} for {GREATER/LESS:486071966} 30 days.      Update Admission H&P: {CHP DME Changes in NROBX:403927469}    PHYSICIAN SIGNATURE:  {Esignature:[id number]}

## 2021-03-20 NOTE — PROGRESS NOTES
Physical Therapy  Osmel Dey  [id number]     03/20/21 1048   Subjective   Subjective Attempt, patient up in the chair resting at this time. Patient's daughter present and states patient has been up and down this morning and is resting before transferring to SNF.    Electronically signed by Carlos Denton PTA on 3/20/2021 at 10:49 AM

## 2021-03-20 NOTE — PROGRESS NOTES
Spoke with Gilles Lopez, admissions coordinator, who asked that the discharge summary and AVS be faxed to [phone] and the call her work cell phone whenever faxed. Report should be called to [address] [place] [address] .     Gilles Lopez, admissions coordinator  [phone]    Electronically signed by Maggi Hodge LPN on 7/36/5805 at 5:65 AM

## 2021-03-20 NOTE — DISCHARGE SUMMARY
Discharge Summary  Date:3/20/2021        Patient Naima Strong     YOB: 1940     Age:80 y.o. Admit Date:3/16/2021   Admission Condition:poor   Discharged Condition:good  Discharge Date: 03/20/21     Discharge Diagnoses   Principal Problem:    Complicated urinary tract infection  Active Problems:    Dementia ([address]    Altered mental status    Type 2 diabetes mellitus (HCC)    HTN (hypertension)    HLD (hyperlipidemia)    Acute metabolic encephalopathy  Resolved Problems:    * No resolved hospital problems. [facility] Stay   Narrative of Hospital Course: Patient was admitted 3/16, presenting from local nursing home facility due to lethargy, confusion as well as left-sided facial droop, patient has a recurrent history of urinary tract infections per daughter who is a nurse at facility. There has been no recent antibiotic usage, patient did have COVID-19 infection a few months ago and has recovered well. Urinalysis with nitrite +4+ bacteria, urine culture with growth of Klebsiella pneumonia activities resulted, patient received 4 days of ceftriaxone antibiotics, great improvement seen, neurology services have signed off. Covid screening was obtained, patient is stable to be discharged back to skilled nursing facility. Patient will be treated with 3 days of Omnicef antibiotic therapy, education modalities on fall prevention, oral rehydration as well as urinary tract infection have been provided. Case thoroughly discussed with patient's daughter present at the bedside. Consultants:   IP CONSULT TO NEUROLOGY    Time Spent on Discharge:  45 minutes were spent in patient examination, evaluation, counseling as well as medication reconciliation, prescriptions for required medications, discharge plan and follow up.       Surgeries/Procedures Performed:       Treatments:   IV hydration, antibiotics: ceftriaxone, analgesia: acetaminophen, cardiac meds: Coreg, Lasix, labetalol as needed, Toprol-XL, anticoagulation: ASA and LMW heparin, respiratory therapy: O2, therapies: PT, OT and ST and electrolyte stabilization    Significant Diagnostic Studies:   Recent Labs:  CBC:   Lab Results   Component Value Date    WBC 7.5 03/20/2021    RBC 3.82 03/20/2021    HGB 11.6 03/20/2021    HCT 37.9 03/20/2021    MCV 99.2 03/20/2021    MCH 30.4 03/20/2021    MCHC 30.6 03/20/2021    RDW 12.3 03/20/2021     03/20/2021     BMP:    Lab Results   Component Value Date    GLUCOSE 93 03/19/2021     03/19/2021    K 3.7 03/19/2021     03/19/2021    CO2 27 03/19/2021    ANIONGAP 9 03/19/2021    BUN 17 03/19/2021    CREATININE 0.6 03/19/2021    CALCIUM 8.5 03/19/2021    LABGLOM >60 03/19/2021    GFRAA >59 03/19/2021     HFP:    Lab Results   Component Value Date    PROT 7.2 03/16/2021     CMP:    Lab Results   Component Value Date    GLUCOSE 93 03/19/2021     03/19/2021    K 3.7 03/19/2021     03/19/2021    CO2 27 03/19/2021    BUN 17 03/19/2021    CREATININE 0.6 03/19/2021    ANIONGAP 9 03/19/2021    ALKPHOS 69 03/16/2021    ALT 7 03/16/2021    AST 18 03/16/2021    BILITOT <0.2 03/16/2021    LABALBU 4.0 03/16/2021    LABGLOM >60 03/19/2021    GFRAA >59 03/19/2021    PROT 7.2 03/16/2021    CALCIUM 8.5 03/19/2021     PT/INR:  No results found for: PTINR, PROTIME, INR  PTT: No results found for: APTT  FLP:    Lab Results   Component Value Date    CHOL 132 03/17/2021    TRIG 120 03/17/2021    HDL 36 03/17/2021     U/A:    Lab Results   Component Value Date    COLORU YELLOW 03/16/2021    SPECGRAV 1.015 03/16/2021    PHUR 5.0 03/16/2021    PROTEINU Negative 03/16/2021    GLUCOSEU Negative 03/16/2021    KETUA Negative 03/16/2021    BILIRUBINUR Negative 03/16/2021    UROBILINOGEN 0.2 03/16/2021    NITRU POSITIVE 03/16/2021    LEUKOCYTESUR MODERATE 03/16/2021     TSH:    Lab Results   Component Value Date    TSH 1.190 03/18/2021       Radiology Last 7 Days:  Ct Head Wo Contrast    Result Date: 3/16/2021  1.    No acute intracranial abnormality. Signed by Dr Yakov Mehta on 3/16/2021 3:29 PM    Physical Exam  Vitals signs and nursing note reviewed. Constitutional:       General: She is not in acute distress. Appearance: She is not ill-appearing. Comments: Sitting up on bedside consuming breakfast   HENT:      Head: Normocephalic and atraumatic. Nose: Nose normal.   Neck:      Musculoskeletal: Normal range of motion. Cardiovascular:      Rate and Rhythm: Normal rate. Pulmonary:      Effort: Pulmonary effort is normal. No respiratory distress. Abdominal:      General: Bowel sounds are normal.      Tenderness: There is no guarding. Neurological:      Mental Status: She is alert. Mental status is at baseline. Psychiatric:         Mood and Affect: Mood normal.      Comments: Appreciate underlying dementia             Discharge Plan   Disposition: To a non-[facility] facility    Provider Follow-Up:   No follow-up provider specified. Patient Instructions   Diet: Diet dysphagia soft and bite-size    Activity: activity as tolerated      Discharge Medications         Medication List      START taking these medications    cefdinir 300 MG capsule  Commonly known as: OMNICEF  Take 1 capsule by mouth 2 times daily for 3 days        CHANGE how you take these medications    loratadine 10 MG tablet  Commonly known as: CLARITIN  What changed: Another medication with the same name was removed. Continue taking this medication, and follow the directions you see here.         CONTINUE taking these medications    acetaminophen 325 MG tablet  Commonly known as: TYLENOL     Folic Acid-Cholecalciferol 1-3775 MG-UNIT Tabs     Folite Tabs  Take 1 tablet by mouth 2 times daily     furosemide 20 MG tablet  Commonly known as: LASIX     guaiFENesin 600 MG extended release tablet  Commonly known as: MUCINEX     memantine 10 MG tablet  Commonly known as: NAMENDA     metoprolol succinate 50 MG extended release tablet  Commonly known as: TOPROL XL     Milk of Magnesia 400 MG/5ML suspension  Generic drug: magnesium hydroxide     omeprazole 20 MG delayed release capsule  Commonly known as: PRILOSEC     PARoxetine 20 MG tablet  Commonly known as: PAXIL     potassium chloride 10 MEQ extended release tablet  Commonly known as: KLOR-CON     raloxifene 60 MG tablet  Commonly known as: EVISTA     risperiDONE 0.5 MG tablet  Commonly known as: RISPERDAL     simvastatin 40 MG tablet  Commonly known as: ZOCOR           Where to Get Your Medications      These medications were sent to [facility], [address] [facility] [address] - P [phone] - F [phone]  [address] [place] [address]    Phone: [phone]   · cefdinir 300 MG capsule         EMR Dragon/Transcription disclaimer:   Much of this encounter note is an electronic transcription/translation of spoken language to printed text.  The electronic translation of spoken language may permit erroneous, or at times, nonsensical words or phrases to be inadvertently transcribed; although attempts have made to review the note for such errors, some may still exist.    Electronically signed by   Jo Crenshaw   Internal Medicine Hospitalist  On 3/20/2021  At 9:46 AM

## 2021-03-21 LAB
BLOOD CULTURE, ROUTINE: NORMAL
BLOOD CULTURE, ROUTINE: NORMAL
CULTURE, BLOOD 2: NORMAL
CULTURE, BLOOD 2: NORMAL

## 2021-03-24 NOTE — ED PROVIDER NOTES
[facility] [facility]  eMERGENCY dEPARTMENT eNCOUnter      Pt Name: Hernando Cherry  MRN: [id number]  [place] 1940  Date of evaluation: 3/16/2021  Provider: Margaret Le MD    CHIEF COMPLAINT       Chief Complaint   Patient presents with    Altered Mental Status     Pt last known well at [phone], she is less responsive now than she typically is          HISTORY OF PRESENT ILLNESS   (Location/Symptom, Timing/Onset,Context/Setting, Quality, Duration, Modifying Factors, Severity)  Note limiting factors. Hernando Cherry is a 78 y.o. female who presents to the emergency department [address] with altered mental status. Patient's last known well was at 12:37 PM today. Patient has decreased responsiveness to verbal stimuli. Patient is unable to give history at this time. HPI    NursingNotes were reviewed. REVIEW OF SYSTEMS    (2-9 systems for level 4, 10 or more for level 5)     Review of Systems   Unable to perform ROS: Mental status change            PAST MEDICALHISTORY     Past Medical History:   Diagnosis Date    Dementia without behavioral disturbance ([address]     Dysphagia     GERD (gastroesophageal reflux disease)     History of COVID-19     History of fall     History of urinary tract infection     Hyperlipidemia     Osteoporosis     Other schizoaffective disorders ([address]     Personal care impairment     Recurrent major depression ([address]     Type 2 diabetes mellitus ([address]     Vascular dementia ([address]          SURGICAL HISTORY     History reviewed. No pertinent surgical history.       CURRENT MEDICATIONS     Discharge Medication List as of 3/20/2021 10:32 AM      CONTINUE these medications which have NOT CHANGED    Details   acetaminophen (TYLENOL) 325 MG tablet Take 650 mg by mouth every 6 hours as needed for PainHistorical Med      Folic Acid-Cholecalciferol 1-3775 MG-UNIT TABS Take by mouth, DAWHistorical Med      furosemide (LASIX) 20 MG tablet Take 20 mg by mouth dailyHistorical Med loratadine (CLARITIN) 10 MG tablet Take 10 mg by mouth dailyHistorical Med      metoprolol succinate (TOPROL XL) 50 MG extended release tablet Take 50 mg by mouth dailyHistorical Med      magnesium hydroxide (MILK OF MAGNESIA) 400 MG/5ML suspension Take 5 mLs by mouth daily as needed for ConstipationHistorical Med      guaiFENesin (MUCINEX) 600 MG extended release tablet Take 1,200 mg by mouth 2 times dailyHistorical Med      memantine (NAMENDA) 10 MG tablet Take 10 mg by mouth 2 times dailyHistorical Med      PARoxetine (PAXIL) 20 MG tablet Take 20 mg by mouth every morningHistorical Med      potassium chloride (KLOR-CON) 10 MEQ extended release tablet Take 10 mEq by mouth dailyHistorical Med      omeprazole (PRILOSEC) 20 MG delayed release capsule Take 20 mg by mouth dailyHistorical Med      raloxifene (EVISTA) 60 MG tablet Take 60 mg by mouth dailyHistorical Med      risperiDONE (RISPERDAL) 0.5 MG tablet Take 0.5 mg by mouth 2 times dailyHistorical Med      simvastatin (ZOCOR) 40 MG tablet Take 40 mg by mouth nightlyHistorical Med      FA-D3-Mg Cit-Acetylcyst-Ca Cit (FOLITE) TABS Take 1 tablet by mouth 2 times daily, Disp-60 tablet, R-11Normal             ALLERGIES     Fosamax [alendronate]    FAMILY HISTORY     History reviewed. No pertinent family history.        SOCIAL HISTORY       Social History     Socioeconomic History    Marital status: Unknown     Spouse name: None    Number of children: None    Years of education: None    Highest education level: None   Occupational History    None   Social Needs    Financial resource strain: None    Food insecurity     Worry: None     Inability: None    Transportation needs     Medical: None     Non-medical: None   Tobacco Use    Smoking status: None   Substance and Sexual Activity    Alcohol use: None    Drug use: None    Sexual activity: None   Lifestyle    Physical activity     Days per week: None     Minutes per session: None    Stress: None Mouth: Mucous membranes are moist.      Pharynx: Oropharynx is clear. No oropharyngeal exudate. Eyes:      General: No scleral icterus. Conjunctiva/sclera: Conjunctivae normal.      Pupils: Pupils are equal, round, and reactive to light. Neck:      Musculoskeletal: Neck supple. No neck rigidity. Cardiovascular:      Rate and Rhythm: Normal rate and regular rhythm. Pulses: Normal pulses. Heart sounds: Normal heart sounds. Pulmonary:      Effort: Pulmonary effort is normal.      Breath sounds: Normal breath sounds. Abdominal:      General: Bowel sounds are normal.      Palpations: Abdomen is soft. Tenderness: There is no abdominal tenderness. There is no guarding. Musculoskeletal:         General: No tenderness or deformity. Skin:     General: Skin is warm and dry. Capillary Refill: Capillary refill takes less than 2 seconds. Coloration: Skin is pale. Skin is not jaundiced. Neurological:      General: No focal deficit present. Mental Status: She is alert and oriented to person, place, and time. Mental status is at baseline. Coordination: Coordination normal.   Psychiatric:         Mood and Affect: Mood normal.         Behavior: Behavior normal.         DIAGNOSTIC RESULTS     EKG: All EKG's areinterpreted by the Emergency Department Physician who either signs or Co-signs this chart in the absence of a cardiologist.    EKG dated 3/16/2021 at 1515 p.m.: Normal sinus rhythm, rate 83. PVC present. Artifact present. Nonspecific T wave changes in V1 and V2.    RADIOLOGY:  Non-plain film images such as CT, Ultrasound and MRI are read by the radiologist. Plain radiographic images are visualized and preliminarily interpreted bythe emergency physician with the below findings:      CT Head WO Contrast   Final Result   1. No acute intracranial abnormality.    Signed by Dr Alex Lenz on 3/16/2021 3:29 PM              LABS:  Labs Reviewed   CULTURE, URINE - Abnormal; Notable for the following components:       Result Value    Urine Culture, Routine >100,000 CFU/ml (*)     Organism Klebsiella pneumoniae (*)     All other components within normal limits    Narrative:     ORDER#: [id number]                          ORDERED BY: EARL Kirby  SOURCE: Urine Clean Catch                  COLLECTED:  03/16/21 14:57  [facility] AT [place].:                      RECEIVED :  03/16/21 15:47   CBC WITH AUTO DIFFERENTIAL - Abnormal; Notable for the following components:    RBC 4.12 (*)     MCHC 32.1 (*)     All other components within normal limits   COMPREHENSIVE METABOLIC PANEL W/ REFLEX TO MG FOR LOW K - Abnormal; Notable for the following components:    CO2 32 (*)     BUN 24 (*)     All other components within normal limits   URINE RT REFLEX TO CULTURE - Abnormal; Notable for the following components:    Nitrite, Urine POSITIVE (*)     Leukocyte Esterase, Urine MODERATE (*)     All other components within normal limits   MICROSCOPIC URINALYSIS - Abnormal; Notable for the following components:    Bacteria, UA 4+ (*)     Crystals, UA NEG (*)     WBC, UA 67 (*)     All other components within normal limits   BASIC METABOLIC PANEL W/ REFLEX TO MG FOR LOW K - Abnormal; Notable for the following components:    BUN 24 (*)     Calcium 8.2 (*)     All other components within normal limits   CBC - Abnormal; Notable for the following components:    RBC 3.50 (*)     Hemoglobin 10.5 (*)     Hematocrit 33.5 (*)     MCHC 31.3 (*)     All other components within normal limits   LIPID PANEL - Abnormal; Notable for the following components:    Cholesterol, Total 132 (*)     HDL 36 (*)     All other components within normal limits   BASIC METABOLIC PANEL W/ REFLEX TO MG FOR LOW K - Abnormal; Notable for the following components:    Calcium 8.5 (*)     All other components within normal limits   CBC - Abnormal; Notable for the following components:    RBC 3.66 (*)     Hemoglobin 11.1 (*)     Hematocrit 35.1 (*)     MCHC 31.6 (*)     All other components within normal limits   BASIC METABOLIC PANEL W/ REFLEX TO MG FOR LOW K - Abnormal; Notable for the following components:    Calcium 8.5 (*)     All other components within normal limits   CBC - Abnormal; Notable for the following components:    RBC 3.51 (*)     Hemoglobin 10.7 (*)     Hematocrit 33.5 (*)     MCHC 31.9 (*)     All other components within normal limits   CBC - Abnormal; Notable for the following components:    RBC 3.82 (*)     Hemoglobin 11.6 (*)     MCV 99.2 (*)     MCHC 30.6 (*)     All other components within normal limits   CULTURE, BLOOD 1    Narrative:     ORDER#: [id number]                          ORDERED BY: EARL GRACE  SOURCE: Blood Antecubital-Rig              COLLECTED:  03/16/21 16:11  [facility] [place].:                      RECEIVED :  03/16/21 16:15   CULTURE, BLOOD 2    Narrative:     ORDER#: [id number]                          ORDERED BY: Jimmy Bar  SOURCE: Blood RFA                          COLLECTED:  03/16/21 17:37  [facility] AT [place].:                      RECEIVED :  03/16/21 17:40   COVID-19, RAPID   CULTURE, BLOOD 1    Narrative:     ORDER#: [id number]                          ORDERED BY: MELVA GRIMES  SOURCE: Blood Hand, Right                  COLLECTED:  03/16/21 19:28  [facility] [place].:                      RECEIVED :  03/16/21 19:39   CULTURE, BLOOD 2    Narrative:     ORDER#: [id number]                          ORDERED BY: MELVA GRIMES  SOURCE: Blood LFA                          COLLECTED:  03/16/21 19:29  [facility] [place].:                      RECEIVED :  03/16/21 19:39   COVID-19, RAPID    Narrative:     ORDER WAS CANCELLED 03/20/2021 09:40, wrong order need rapid.    TROPONIN   BRAIN NATRIURETIC PEPTIDE   LACTIC ACID, PLASMA   LACTATE, SEPSIS   LACTATE, SEPSIS   VITAMIN B12   FOLATE   TSH WITHOUT REFLEX   POCT GLUCOSE   POCT GLUCOSE       All other labs were within normal range or not returned as of this dictation. EMERGENCY DEPARTMENT COURSE and DIFFERENTIAL DIAGNOSIS/MDM:   Vitals:    Vitals:    03/19/21 2329 03/20/21 0411 03/20/21 0610 03/20/21 1033   BP: 122/70 130/72 117/68 113/72   Pulse: 79 90 69 69   Resp: 18 18 17 17   Temp: 97.6 °F (36.4 °C) 97.1 °F (36.2 °C) 97 °F (36.1 °C) 98.1 °F (36.7 °C)   TempSrc: Temporal Temporal Temporal Temporal   SpO2: 96% 96% 92% 93%   Weight:           MDM  78-year-old female presents with altered mental status. Lab, EKG, and radiology results reviewed. Patient diagnosed with altered mental status and acute urinary tract infection. Antibiotic started in the emergency department. Discussed with hospitalist who will admit patient for further evaluation and treatment. CONSULTS:  IP CONSULT TO NEUROLOGY    PROCEDURES:  Unless otherwise noted below, none     Procedures    FINAL IMPRESSION      1. Altered mental status, unspecified altered mental status type    2. Acute urinary tract infection          DISPOSITION/PLAN   DISPOSITION Admitted 03/16/2021 06:05:05 PM      PATIENT REFERRED TO:  No follow-up provider specified.     DISCHARGE MEDICATIONS:  Discharge Medication List as of 3/20/2021 10:32 AM      START taking these medications    Details   cefdinir (OMNICEF) 300 MG capsule Take 1 capsule by mouth 2 times daily for 3 days, Disp-6 capsule, R-0Normal                (Please note that portions of this note were completed with a voice recognition program.  Efforts were made to edit thedictations but occasionally words are mis-transcribed.)    Daniel Loera MD (electronically signed)  Attending Emergency Physician          Daniel Loera MD  03/23/21 [id number]

## 2021-10-25 RX ORDER — FOLIC ACID 1 MG/1
TABLET ORAL
Qty: 30 TABLET | Refills: 11 | Status: SHIPPED | OUTPATIENT
Start: 2021-10-25

## 2021-11-22 RX ORDER — LORATADINE 10 MG/1
TABLET ORAL
Qty: 14 TABLET | Refills: 10 | Status: SHIPPED | OUTPATIENT
Start: 2021-11-22

## 2021-12-20 RX ORDER — SIMVASTATIN 40 MG
TABLET ORAL
Qty: 28 TABLET | Refills: 11 | Status: SHIPPED | OUTPATIENT
Start: 2021-12-20

## 2024-01-17 ENCOUNTER — LAB REQUISITION (OUTPATIENT)
Dept: LAB | Facility: HOSPITAL | Age: 84
End: 2024-01-17
Payer: MEDICARE

## 2024-01-17 DIAGNOSIS — R06.2 WHEEZING: ICD-10-CM

## 2024-01-17 DIAGNOSIS — R41.82 ALTERED MENTAL STATUS, UNSPECIFIED: ICD-10-CM

## 2024-01-17 DIAGNOSIS — G93.41 METABOLIC ENCEPHALOPATHY: ICD-10-CM

## 2024-01-17 DIAGNOSIS — Z87.01 PERSONAL HISTORY OF PNEUMONIA (RECURRENT): ICD-10-CM

## 2024-01-17 LAB
ALBUMIN SERPL-MCNC: 3.7 G/DL (ref 3.5–5.2)
ALBUMIN/GLOB SERPL: 1.3 G/DL
ALP SERPL-CCNC: 65 U/L (ref 39–117)
ALT SERPL W P-5'-P-CCNC: 10 U/L (ref 1–33)
ANION GAP SERPL CALCULATED.3IONS-SCNC: 8 MMOL/L (ref 5–15)
AST SERPL-CCNC: 15 U/L (ref 1–32)
BASOPHILS # BLD AUTO: 0.06 10*3/MM3 (ref 0–0.2)
BASOPHILS NFR BLD AUTO: 0.9 % (ref 0–1.5)
BILIRUB SERPL-MCNC: 0.2 MG/DL (ref 0–1.2)
BUN SERPL-MCNC: 23 MG/DL (ref 8–23)
BUN/CREAT SERPL: 31.9 (ref 7–25)
CALCIUM SPEC-SCNC: 8.7 MG/DL (ref 8.6–10.5)
CHLORIDE SERPL-SCNC: 105 MMOL/L (ref 98–107)
CO2 SERPL-SCNC: 31 MMOL/L (ref 22–29)
CREAT SERPL-MCNC: 0.72 MG/DL (ref 0.57–1)
DEPRECATED RDW RBC AUTO: 45.1 FL (ref 37–54)
EGFRCR SERPLBLD CKD-EPI 2021: 82.1 ML/MIN/1.73
EOSINOPHIL # BLD AUTO: 0.37 10*3/MM3 (ref 0–0.4)
EOSINOPHIL NFR BLD AUTO: 5.7 % (ref 0.3–6.2)
ERYTHROCYTE [DISTWIDTH] IN BLOOD BY AUTOMATED COUNT: 12.6 % (ref 12.3–15.4)
GLOBULIN UR ELPH-MCNC: 2.8 GM/DL
GLUCOSE SERPL-MCNC: 108 MG/DL (ref 65–99)
HCT VFR BLD AUTO: 36.2 % (ref 34–46.6)
HGB BLD-MCNC: 10.9 G/DL (ref 12–15.9)
IMM GRANULOCYTES # BLD AUTO: 0.01 10*3/MM3 (ref 0–0.05)
IMM GRANULOCYTES NFR BLD AUTO: 0.2 % (ref 0–0.5)
LYMPHOCYTES # BLD AUTO: 1.62 10*3/MM3 (ref 0.7–3.1)
LYMPHOCYTES NFR BLD AUTO: 25.2 % (ref 19.6–45.3)
MCH RBC QN AUTO: 29.5 PG (ref 26.6–33)
MCHC RBC AUTO-ENTMCNC: 30.1 G/DL (ref 31.5–35.7)
MCV RBC AUTO: 97.8 FL (ref 79–97)
MONOCYTES # BLD AUTO: 0.8 10*3/MM3 (ref 0.1–0.9)
MONOCYTES NFR BLD AUTO: 12.4 % (ref 5–12)
NEUTROPHILS NFR BLD AUTO: 3.58 10*3/MM3 (ref 1.7–7)
NEUTROPHILS NFR BLD AUTO: 55.6 % (ref 42.7–76)
NRBC BLD AUTO-RTO: 0 /100 WBC (ref 0–0.2)
PLATELET # BLD AUTO: 175 10*3/MM3 (ref 140–450)
PMV BLD AUTO: 12.9 FL (ref 6–12)
POTASSIUM SERPL-SCNC: 4.2 MMOL/L (ref 3.5–5.2)
PROT SERPL-MCNC: 6.5 G/DL (ref 6–8.5)
RBC # BLD AUTO: 3.7 10*6/MM3 (ref 3.77–5.28)
SODIUM SERPL-SCNC: 144 MMOL/L (ref 136–145)
WBC NRBC COR # BLD AUTO: 6.44 10*3/MM3 (ref 3.4–10.8)

## 2024-01-17 PROCEDURE — 36415 COLL VENOUS BLD VENIPUNCTURE: CPT | Performed by: FAMILY MEDICINE

## 2024-01-17 PROCEDURE — 85025 COMPLETE CBC W/AUTO DIFF WBC: CPT | Performed by: FAMILY MEDICINE

## 2024-01-17 PROCEDURE — 80053 COMPREHEN METABOLIC PANEL: CPT | Performed by: FAMILY MEDICINE

## 2024-01-18 PROCEDURE — 0202U NFCT DS 22 TRGT SARS-COV-2: CPT | Performed by: FAMILY MEDICINE

## 2024-01-19 ENCOUNTER — LAB REQUISITION (OUTPATIENT)
Dept: LAB | Facility: HOSPITAL | Age: 84
End: 2024-01-19
Payer: MEDICARE

## 2024-01-19 DIAGNOSIS — G93.41 METABOLIC ENCEPHALOPATHY: ICD-10-CM

## 2024-01-19 DIAGNOSIS — R06.2 WHEEZING: ICD-10-CM

## 2024-02-02 ENCOUNTER — LAB REQUISITION (OUTPATIENT)
Dept: LAB | Facility: HOSPITAL | Age: 84
End: 2024-02-02
Payer: MEDICARE

## 2024-02-02 DIAGNOSIS — I10 ESSENTIAL (PRIMARY) HYPERTENSION: ICD-10-CM

## 2024-02-02 DIAGNOSIS — R06.2 WHEEZING: ICD-10-CM

## 2024-02-02 DIAGNOSIS — M62.551 MUSCLE WASTING AND ATROPHY, NOT ELSEWHERE CLASSIFIED, RIGHT THIGH: ICD-10-CM

## 2024-02-02 LAB
ALBUMIN SERPL-MCNC: 3.4 G/DL (ref 3.5–5.2)
ALBUMIN/GLOB SERPL: 1.2 G/DL
ALP SERPL-CCNC: 90 U/L (ref 39–117)
ALT SERPL W P-5'-P-CCNC: 13 U/L (ref 1–33)
ANION GAP SERPL CALCULATED.3IONS-SCNC: 10 MMOL/L (ref 5–15)
AST SERPL-CCNC: 17 U/L (ref 1–32)
BILIRUB SERPL-MCNC: 0.2 MG/DL (ref 0–1.2)
BUN SERPL-MCNC: 28 MG/DL (ref 8–23)
BUN/CREAT SERPL: 36.4 (ref 7–25)
CALCIUM SPEC-SCNC: 8.4 MG/DL (ref 8.6–10.5)
CHLORIDE SERPL-SCNC: 105 MMOL/L (ref 98–107)
CO2 SERPL-SCNC: 29 MMOL/L (ref 22–29)
CREAT SERPL-MCNC: 0.77 MG/DL (ref 0.57–1)
DEPRECATED RDW RBC AUTO: 44.2 FL (ref 37–54)
EGFRCR SERPLBLD CKD-EPI 2021: 75.7 ML/MIN/1.73
EOSINOPHIL # BLD MANUAL: 0.19 10*3/MM3 (ref 0–0.4)
EOSINOPHIL NFR BLD MANUAL: 2 % (ref 0.3–6.2)
ERYTHROCYTE [DISTWIDTH] IN BLOOD BY AUTOMATED COUNT: 12.8 % (ref 12.3–15.4)
GLOBULIN UR ELPH-MCNC: 2.9 GM/DL
GLUCOSE SERPL-MCNC: 143 MG/DL (ref 65–99)
HCT VFR BLD AUTO: 38.5 % (ref 34–46.6)
HGB BLD-MCNC: 11.9 G/DL (ref 12–15.9)
LYMPHOCYTES # BLD MANUAL: 2.07 10*3/MM3 (ref 0.7–3.1)
LYMPHOCYTES NFR BLD MANUAL: 3 % (ref 5–12)
MCH RBC QN AUTO: 29.3 PG (ref 26.6–33)
MCHC RBC AUTO-ENTMCNC: 30.9 G/DL (ref 31.5–35.7)
MCV RBC AUTO: 94.8 FL (ref 79–97)
MONOCYTES # BLD: 0.28 10*3/MM3 (ref 0.1–0.9)
NEUTROPHILS # BLD AUTO: 6.79 10*3/MM3 (ref 1.7–7)
NEUTROPHILS NFR BLD MANUAL: 66.7 % (ref 42.7–76)
NEUTS BAND NFR BLD MANUAL: 6.1 % (ref 0–5)
PLAT MORPH BLD: NORMAL
PLATELET # BLD AUTO: 147 10*3/MM3 (ref 140–450)
PMV BLD AUTO: 12.9 FL (ref 6–12)
POIKILOCYTOSIS BLD QL SMEAR: ABNORMAL
POTASSIUM SERPL-SCNC: 3.9 MMOL/L (ref 3.5–5.2)
PROT SERPL-MCNC: 6.3 G/DL (ref 6–8.5)
RBC # BLD AUTO: 4.06 10*6/MM3 (ref 3.77–5.28)
SODIUM SERPL-SCNC: 144 MMOL/L (ref 136–145)
VARIANT LYMPHS NFR BLD MANUAL: 2 % (ref 0–5)
VARIANT LYMPHS NFR BLD MANUAL: 20.2 % (ref 19.6–45.3)
WBC MORPH BLD: NORMAL
WBC NRBC COR # BLD AUTO: 9.34 10*3/MM3 (ref 3.4–10.8)

## 2024-02-02 PROCEDURE — 36415 COLL VENOUS BLD VENIPUNCTURE: CPT | Performed by: FAMILY MEDICINE

## 2024-02-02 PROCEDURE — 80053 COMPREHEN METABOLIC PANEL: CPT | Performed by: FAMILY MEDICINE

## 2024-02-02 PROCEDURE — 85025 COMPLETE CBC W/AUTO DIFF WBC: CPT | Performed by: FAMILY MEDICINE

## 2024-04-10 ENCOUNTER — LAB REQUISITION (OUTPATIENT)
Dept: LAB | Facility: HOSPITAL | Age: 84
End: 2024-04-10
Payer: MEDICARE

## 2024-04-10 DIAGNOSIS — I10 ESSENTIAL (PRIMARY) HYPERTENSION: ICD-10-CM

## 2024-04-10 DIAGNOSIS — E78.5 HYPERLIPIDEMIA, UNSPECIFIED: ICD-10-CM

## 2024-04-10 DIAGNOSIS — E83.10 DISORDER OF IRON METABOLISM, UNSPECIFIED: ICD-10-CM

## 2024-04-10 LAB
ALBUMIN SERPL-MCNC: 3.6 G/DL (ref 3.5–5.2)
ALBUMIN/GLOB SERPL: 1.2 G/DL
ALP SERPL-CCNC: 83 U/L (ref 39–117)
ALT SERPL W P-5'-P-CCNC: 10 U/L (ref 1–33)
ANION GAP SERPL CALCULATED.3IONS-SCNC: 10 MMOL/L (ref 5–15)
AST SERPL-CCNC: 14 U/L (ref 1–32)
BASOPHILS # BLD AUTO: 0.09 10*3/MM3 (ref 0–0.2)
BASOPHILS NFR BLD AUTO: 0.9 % (ref 0–1.5)
BILIRUB SERPL-MCNC: 0.2 MG/DL (ref 0–1.2)
BUN SERPL-MCNC: 26 MG/DL (ref 8–23)
BUN/CREAT SERPL: 37.7 (ref 7–25)
CALCIUM SPEC-SCNC: 8.7 MG/DL (ref 8.6–10.5)
CHLORIDE SERPL-SCNC: 103 MMOL/L (ref 98–107)
CHOLEST SERPL-MCNC: 136 MG/DL (ref 0–200)
CO2 SERPL-SCNC: 30 MMOL/L (ref 22–29)
CREAT SERPL-MCNC: 0.69 MG/DL (ref 0.57–1)
DEPRECATED RDW RBC AUTO: 43.9 FL (ref 37–54)
EGFRCR SERPLBLD CKD-EPI 2021: 85.2 ML/MIN/1.73
EOSINOPHIL # BLD AUTO: 0.27 10*3/MM3 (ref 0–0.4)
EOSINOPHIL NFR BLD AUTO: 2.6 % (ref 0.3–6.2)
ERYTHROCYTE [DISTWIDTH] IN BLOOD BY AUTOMATED COUNT: 12.4 % (ref 12.3–15.4)
GLOBULIN UR ELPH-MCNC: 2.9 GM/DL
GLUCOSE SERPL-MCNC: 104 MG/DL (ref 65–99)
HCT VFR BLD AUTO: 38.1 % (ref 34–46.6)
HDLC SERPL-MCNC: 43 MG/DL (ref 40–60)
HGB BLD-MCNC: 11.8 G/DL (ref 12–15.9)
IMM GRANULOCYTES # BLD AUTO: 0.02 10*3/MM3 (ref 0–0.05)
IMM GRANULOCYTES NFR BLD AUTO: 0.2 % (ref 0–0.5)
LDLC SERPL CALC-MCNC: 69 MG/DL (ref 0–100)
LDLC/HDLC SERPL: 1.52 {RATIO}
LYMPHOCYTES # BLD AUTO: 2.39 10*3/MM3 (ref 0.7–3.1)
LYMPHOCYTES NFR BLD AUTO: 22.7 % (ref 19.6–45.3)
MCH RBC QN AUTO: 29.6 PG (ref 26.6–33)
MCHC RBC AUTO-ENTMCNC: 31 G/DL (ref 31.5–35.7)
MCV RBC AUTO: 95.7 FL (ref 79–97)
MONOCYTES # BLD AUTO: 0.85 10*3/MM3 (ref 0.1–0.9)
MONOCYTES NFR BLD AUTO: 8.1 % (ref 5–12)
NEUTROPHILS NFR BLD AUTO: 6.92 10*3/MM3 (ref 1.7–7)
NEUTROPHILS NFR BLD AUTO: 65.5 % (ref 42.7–76)
NRBC BLD AUTO-RTO: 0 /100 WBC (ref 0–0.2)
PLATELET # BLD AUTO: 181 10*3/MM3 (ref 140–450)
PMV BLD AUTO: 12.6 FL (ref 6–12)
POTASSIUM SERPL-SCNC: 4.1 MMOL/L (ref 3.5–5.2)
PROT SERPL-MCNC: 6.5 G/DL (ref 6–8.5)
RBC # BLD AUTO: 3.98 10*6/MM3 (ref 3.77–5.28)
SODIUM SERPL-SCNC: 143 MMOL/L (ref 136–145)
TRIGL SERPL-MCNC: 138 MG/DL (ref 0–150)
VLDLC SERPL-MCNC: 24 MG/DL (ref 5–40)
WBC NRBC COR # BLD AUTO: 10.54 10*3/MM3 (ref 3.4–10.8)

## 2024-04-10 PROCEDURE — 80053 COMPREHEN METABOLIC PANEL: CPT | Performed by: FAMILY MEDICINE

## 2024-04-10 PROCEDURE — 85025 COMPLETE CBC W/AUTO DIFF WBC: CPT | Performed by: FAMILY MEDICINE

## 2024-04-10 PROCEDURE — 36415 COLL VENOUS BLD VENIPUNCTURE: CPT | Performed by: FAMILY MEDICINE

## 2024-04-10 PROCEDURE — 80061 LIPID PANEL: CPT | Performed by: FAMILY MEDICINE

## 2024-06-02 PROCEDURE — 36415 COLL VENOUS BLD VENIPUNCTURE: CPT | Performed by: FAMILY MEDICINE

## 2024-06-02 PROCEDURE — 87186 SC STD MICRODIL/AGAR DIL: CPT | Performed by: FAMILY MEDICINE

## 2024-06-02 PROCEDURE — 87077 CULTURE AEROBIC IDENTIFY: CPT | Performed by: FAMILY MEDICINE

## 2024-06-02 PROCEDURE — 87086 URINE CULTURE/COLONY COUNT: CPT | Performed by: FAMILY MEDICINE

## 2024-06-03 ENCOUNTER — LAB REQUISITION (OUTPATIENT)
Dept: LAB | Facility: HOSPITAL | Age: 84
End: 2024-06-03
Payer: MEDICARE

## 2024-06-03 DIAGNOSIS — R82.998 OTHER ABNORMAL FINDINGS IN URINE: ICD-10-CM

## 2024-06-06 LAB — BACTERIA SPEC AEROBE CULT: ABNORMAL

## 2024-07-29 ENCOUNTER — LAB REQUISITION (OUTPATIENT)
Dept: LAB | Facility: HOSPITAL | Age: 84
End: 2024-07-29
Payer: MEDICARE

## 2024-07-29 DIAGNOSIS — R06.2 WHEEZING: ICD-10-CM

## 2024-07-29 DIAGNOSIS — Z87.01 PERSONAL HISTORY OF PNEUMONIA (RECURRENT): ICD-10-CM

## 2024-07-29 DIAGNOSIS — E78.5 HYPERLIPIDEMIA, UNSPECIFIED: ICD-10-CM

## 2024-07-29 DIAGNOSIS — E11.8 TYPE 2 DIABETES MELLITUS WITH UNSPECIFIED COMPLICATIONS: ICD-10-CM

## 2024-07-29 LAB
ALBUMIN SERPL-MCNC: 3.2 G/DL (ref 3.5–5.2)
ALBUMIN/GLOB SERPL: 1 G/DL
ALP SERPL-CCNC: 99 U/L (ref 39–117)
ALT SERPL W P-5'-P-CCNC: 10 U/L (ref 1–33)
ANION GAP SERPL CALCULATED.3IONS-SCNC: 12 MMOL/L (ref 5–15)
AST SERPL-CCNC: 13 U/L (ref 1–32)
BASOPHILS # BLD AUTO: 0.08 10*3/MM3 (ref 0–0.2)
BASOPHILS NFR BLD AUTO: 0.7 % (ref 0–1.5)
BILIRUB SERPL-MCNC: 0.3 MG/DL (ref 0–1.2)
BUN SERPL-MCNC: 24 MG/DL (ref 8–23)
BUN/CREAT SERPL: 31.6 (ref 7–25)
CALCIUM SPEC-SCNC: 8.9 MG/DL (ref 8.6–10.5)
CHLORIDE SERPL-SCNC: 102 MMOL/L (ref 98–107)
CO2 SERPL-SCNC: 28 MMOL/L (ref 22–29)
CREAT SERPL-MCNC: 0.76 MG/DL (ref 0.57–1)
DEPRECATED RDW RBC AUTO: 42.6 FL (ref 37–54)
EGFRCR SERPLBLD CKD-EPI 2021: 76.4 ML/MIN/1.73
EOSINOPHIL # BLD AUTO: 0.4 10*3/MM3 (ref 0–0.4)
EOSINOPHIL NFR BLD AUTO: 3.4 % (ref 0.3–6.2)
ERYTHROCYTE [DISTWIDTH] IN BLOOD BY AUTOMATED COUNT: 12.2 % (ref 12.3–15.4)
GLOBULIN UR ELPH-MCNC: 3.3 GM/DL
GLUCOSE SERPL-MCNC: 125 MG/DL (ref 65–99)
HCT VFR BLD AUTO: 34 % (ref 34–46.6)
HGB BLD-MCNC: 10.5 G/DL (ref 12–15.9)
IMM GRANULOCYTES # BLD AUTO: 0.05 10*3/MM3 (ref 0–0.05)
IMM GRANULOCYTES NFR BLD AUTO: 0.4 % (ref 0–0.5)
LYMPHOCYTES # BLD AUTO: 2.02 10*3/MM3 (ref 0.7–3.1)
LYMPHOCYTES NFR BLD AUTO: 17.3 % (ref 19.6–45.3)
MCH RBC QN AUTO: 29.1 PG (ref 26.6–33)
MCHC RBC AUTO-ENTMCNC: 30.9 G/DL (ref 31.5–35.7)
MCV RBC AUTO: 94.2 FL (ref 79–97)
MONOCYTES # BLD AUTO: 1.22 10*3/MM3 (ref 0.1–0.9)
MONOCYTES NFR BLD AUTO: 10.5 % (ref 5–12)
NEUTROPHILS NFR BLD AUTO: 67.7 % (ref 42.7–76)
NEUTROPHILS NFR BLD AUTO: 7.9 10*3/MM3 (ref 1.7–7)
NRBC BLD AUTO-RTO: 0 /100 WBC (ref 0–0.2)
PLATELET # BLD AUTO: 232 10*3/MM3 (ref 140–450)
PMV BLD AUTO: 12.4 FL (ref 6–12)
POTASSIUM SERPL-SCNC: 3.7 MMOL/L (ref 3.5–5.2)
PROT SERPL-MCNC: 6.5 G/DL (ref 6–8.5)
RBC # BLD AUTO: 3.61 10*6/MM3 (ref 3.77–5.28)
SODIUM SERPL-SCNC: 142 MMOL/L (ref 136–145)
WBC NRBC COR # BLD AUTO: 11.67 10*3/MM3 (ref 3.4–10.8)

## 2024-07-29 PROCEDURE — 36415 COLL VENOUS BLD VENIPUNCTURE: CPT | Performed by: NURSE PRACTITIONER

## 2024-07-29 PROCEDURE — 80053 COMPREHEN METABOLIC PANEL: CPT | Performed by: NURSE PRACTITIONER

## 2024-07-29 PROCEDURE — 85025 COMPLETE CBC W/AUTO DIFF WBC: CPT | Performed by: NURSE PRACTITIONER

## 2024-09-04 ENCOUNTER — LAB REQUISITION (OUTPATIENT)
Dept: LAB | Facility: HOSPITAL | Age: 84
End: 2024-09-04
Payer: MEDICARE

## 2024-09-04 DIAGNOSIS — I10 ESSENTIAL (PRIMARY) HYPERTENSION: ICD-10-CM

## 2024-09-04 DIAGNOSIS — E11.8 TYPE 2 DIABETES MELLITUS WITH UNSPECIFIED COMPLICATIONS: ICD-10-CM

## 2024-09-04 LAB
ALBUMIN SERPL-MCNC: 3.3 G/DL (ref 3.5–5.2)
ALBUMIN/GLOB SERPL: 1 G/DL
ALP SERPL-CCNC: 80 U/L (ref 39–117)
ALT SERPL W P-5'-P-CCNC: 7 U/L (ref 1–33)
ANION GAP SERPL CALCULATED.3IONS-SCNC: 11 MMOL/L (ref 5–15)
AST SERPL-CCNC: 14 U/L (ref 1–32)
BASOPHILS # BLD AUTO: 0.07 10*3/MM3 (ref 0–0.2)
BASOPHILS NFR BLD AUTO: 0.7 % (ref 0–1.5)
BILIRUB SERPL-MCNC: 0.2 MG/DL (ref 0–1.2)
BUN SERPL-MCNC: 31 MG/DL (ref 8–23)
BUN/CREAT SERPL: 48.4 (ref 7–25)
CALCIUM SPEC-SCNC: 9.4 MG/DL (ref 8.6–10.5)
CHLORIDE SERPL-SCNC: 104 MMOL/L (ref 98–107)
CO2 SERPL-SCNC: 27 MMOL/L (ref 22–29)
CREAT SERPL-MCNC: 0.64 MG/DL (ref 0.57–1)
DEPRECATED RDW RBC AUTO: 45.1 FL (ref 37–54)
EGFRCR SERPLBLD CKD-EPI 2021: 86.2 ML/MIN/1.73
EOSINOPHIL # BLD AUTO: 0.25 10*3/MM3 (ref 0–0.4)
EOSINOPHIL NFR BLD AUTO: 2.6 % (ref 0.3–6.2)
ERYTHROCYTE [DISTWIDTH] IN BLOOD BY AUTOMATED COUNT: 13.1 % (ref 12.3–15.4)
GLOBULIN UR ELPH-MCNC: 3.3 GM/DL
GLUCOSE SERPL-MCNC: 121 MG/DL (ref 65–99)
HBA1C MFR BLD: 5.8 % (ref 4.8–5.6)
HCT VFR BLD AUTO: 34.6 % (ref 34–46.6)
HGB BLD-MCNC: 10.7 G/DL (ref 12–15.9)
IMM GRANULOCYTES # BLD AUTO: 0.03 10*3/MM3 (ref 0–0.05)
IMM GRANULOCYTES NFR BLD AUTO: 0.3 % (ref 0–0.5)
LYMPHOCYTES # BLD AUTO: 2.46 10*3/MM3 (ref 0.7–3.1)
LYMPHOCYTES NFR BLD AUTO: 25.7 % (ref 19.6–45.3)
MCH RBC QN AUTO: 29.2 PG (ref 26.6–33)
MCHC RBC AUTO-ENTMCNC: 30.9 G/DL (ref 31.5–35.7)
MCV RBC AUTO: 94.5 FL (ref 79–97)
MONOCYTES # BLD AUTO: 1.01 10*3/MM3 (ref 0.1–0.9)
MONOCYTES NFR BLD AUTO: 10.5 % (ref 5–12)
NEUTROPHILS NFR BLD AUTO: 5.77 10*3/MM3 (ref 1.7–7)
NEUTROPHILS NFR BLD AUTO: 60.2 % (ref 42.7–76)
NRBC BLD AUTO-RTO: 0 /100 WBC (ref 0–0.2)
PLATELET # BLD AUTO: 210 10*3/MM3 (ref 140–450)
PMV BLD AUTO: 12.4 FL (ref 6–12)
POTASSIUM SERPL-SCNC: 3.9 MMOL/L (ref 3.5–5.2)
PROT SERPL-MCNC: 6.6 G/DL (ref 6–8.5)
RBC # BLD AUTO: 3.66 10*6/MM3 (ref 3.77–5.28)
SODIUM SERPL-SCNC: 142 MMOL/L (ref 136–145)
WBC NRBC COR # BLD AUTO: 9.59 10*3/MM3 (ref 3.4–10.8)

## 2024-09-04 PROCEDURE — 85025 COMPLETE CBC W/AUTO DIFF WBC: CPT | Performed by: FAMILY MEDICINE

## 2024-09-04 PROCEDURE — 80053 COMPREHEN METABOLIC PANEL: CPT | Performed by: FAMILY MEDICINE

## 2024-09-04 PROCEDURE — 36415 COLL VENOUS BLD VENIPUNCTURE: CPT | Performed by: FAMILY MEDICINE

## 2024-09-04 PROCEDURE — 83036 HEMOGLOBIN GLYCOSYLATED A1C: CPT | Performed by: FAMILY MEDICINE

## 2024-10-22 DIAGNOSIS — F41.9 ANXIETY: Primary | ICD-10-CM

## 2024-10-22 RX ORDER — LORAZEPAM 0.5 MG/1
0.5 TABLET ORAL EVERY 6 HOURS PRN
Qty: 120 TABLET | Refills: 2 | Status: SHIPPED | OUTPATIENT
Start: 2024-10-22 | End: 2025-01-20

## 2024-10-22 NOTE — TELEPHONE ENCOUNTER
[facility] patient medication refill    CC: Painless, progressive loss of vision  Present Illness: Nuclear sclerotic cataract  Allergies/Current Meds: see meds  Mental Status: A&O x3  Pertinent Medical History: n/a     Physical Exam  General: NAD  HEENT: Eye white/quiet  Lungs: Adequate respirations  Heart: + pulses  Abdomen: soft  Rectal/GI/: deferred     Impression: Cataract  Plan: Phacoemulsification with lens implant

## 2024-11-08 DIAGNOSIS — F41.9 ANXIETY: ICD-10-CM

## 2024-11-13 DIAGNOSIS — F41.9 ANXIETY: ICD-10-CM

## 2024-11-13 RX ORDER — LORAZEPAM 0.5 MG/1
TABLET ORAL
Qty: 90 TABLET | Refills: 0 | OUTPATIENT
Start: 2024-11-13

## 2024-11-14 RX ORDER — LORAZEPAM 0.5 MG/1
TABLET ORAL
Qty: 30 TABLET | Refills: 5 | Status: SHIPPED | OUTPATIENT
Start: 2024-11-14 | End: 2025-05-13

## 2025-01-03 DIAGNOSIS — F41.9 ANXIETY: Primary | ICD-10-CM

## 2025-01-03 RX ORDER — LORAZEPAM 1 MG/1
1 TABLET ORAL EVERY EVENING
Qty: 30 TABLET | Refills: 5 | Status: SHIPPED | OUTPATIENT
Start: 2025-01-03 | End: 2025-07-02

## 2025-01-03 RX ORDER — LORAZEPAM 1 MG/1
TABLET ORAL
Qty: 30 TABLET | Refills: 4 | OUTPATIENT
Start: 2025-01-03

## 2025-02-07 DIAGNOSIS — F41.9 ANXIETY: ICD-10-CM

## 2025-02-10 RX ORDER — LORAZEPAM 1 MG/1
TABLET ORAL
Qty: 28 TABLET | Refills: 4 | OUTPATIENT
Start: 2025-02-10

## 2025-02-27 ENCOUNTER — OFFICE VISIT (OUTPATIENT)
Age: 85
End: 2025-02-27
Payer: MEDICARE

## 2025-02-27 DIAGNOSIS — F01.50 VASCULAR DEMENTIA, UNSPECIFIED DEMENTIA SEVERITY, UNSPECIFIED WHETHER BEHAVIORAL, PSYCHOTIC, OR MOOD DISTURBANCE OR ANXIETY (HCC): ICD-10-CM

## 2025-02-27 DIAGNOSIS — R41.82 ALTERED MENTAL STATUS, UNSPECIFIED ALTERED MENTAL STATUS TYPE: Primary | ICD-10-CM

## 2025-02-27 DIAGNOSIS — G93.41 ACUTE METABOLIC ENCEPHALOPATHY: ICD-10-CM

## 2025-02-27 PROCEDURE — 99307 SBSQ NF CARE SF MDM 10: CPT | Performed by: FAMILY MEDICINE

## 2025-02-27 PROCEDURE — 1123F ACP DISCUSS/DSCN MKR DOCD: CPT | Performed by: FAMILY MEDICINE
